# Patient Record
Sex: FEMALE | Race: WHITE | NOT HISPANIC OR LATINO | Employment: UNEMPLOYED | URBAN - METROPOLITAN AREA
[De-identification: names, ages, dates, MRNs, and addresses within clinical notes are randomized per-mention and may not be internally consistent; named-entity substitution may affect disease eponyms.]

---

## 2018-07-20 ENCOUNTER — HOSPITAL ENCOUNTER (EMERGENCY)
Facility: MEDICAL CENTER | Age: 53
End: 2018-07-20
Attending: EMERGENCY MEDICINE
Payer: MEDICAID

## 2018-07-20 VITALS
SYSTOLIC BLOOD PRESSURE: 110 MMHG | DIASTOLIC BLOOD PRESSURE: 63 MMHG | OXYGEN SATURATION: 95 % | RESPIRATION RATE: 18 BRPM | WEIGHT: 143.3 LBS | TEMPERATURE: 97.2 F | HEIGHT: 64 IN | BODY MASS INDEX: 24.46 KG/M2 | HEART RATE: 69 BPM

## 2018-07-20 DIAGNOSIS — Z77.098 ACCIDENTAL EXPOSURE TO HYDROFLUORIC ACID: ICD-10-CM

## 2018-07-20 LAB
AMPHET UR QL SCN: POSITIVE
ANION GAP SERPL CALC-SCNC: 8 MMOL/L (ref 0–11.9)
APAP SERPL-MCNC: <10 UG/ML (ref 10–30)
BARBITURATES UR QL SCN: NEGATIVE
BASOPHILS # BLD AUTO: 0.7 % (ref 0–1.8)
BASOPHILS # BLD: 0.06 K/UL (ref 0–0.12)
BENZODIAZ UR QL SCN: NEGATIVE
BUN SERPL-MCNC: 16 MG/DL (ref 8–22)
BZE UR QL SCN: NEGATIVE
CA-I SERPL-SCNC: 1.2 MMOL/L (ref 1.1–1.3)
CALCIUM SERPL-MCNC: 9.5 MG/DL (ref 8.5–10.5)
CANNABINOIDS UR QL SCN: NEGATIVE
CHLORIDE SERPL-SCNC: 107 MMOL/L (ref 96–112)
CO2 SERPL-SCNC: 28 MMOL/L (ref 20–33)
CREAT SERPL-MCNC: 0.85 MG/DL (ref 0.5–1.4)
EOSINOPHIL # BLD AUTO: 0.2 K/UL (ref 0–0.51)
EOSINOPHIL NFR BLD: 2.4 % (ref 0–6.9)
ERYTHROCYTE [DISTWIDTH] IN BLOOD BY AUTOMATED COUNT: 39.9 FL (ref 35.9–50)
ETHANOL BLD-MCNC: 0 G/DL
GLUCOSE SERPL-MCNC: 104 MG/DL (ref 65–99)
HCT VFR BLD AUTO: 42 % (ref 37–47)
HGB BLD-MCNC: 14.5 G/DL (ref 12–16)
IMM GRANULOCYTES # BLD AUTO: 0.04 K/UL (ref 0–0.11)
IMM GRANULOCYTES NFR BLD AUTO: 0.5 % (ref 0–0.9)
LYMPHOCYTES # BLD AUTO: 1.49 K/UL (ref 1–4.8)
LYMPHOCYTES NFR BLD: 18.1 % (ref 22–41)
MAGNESIUM SERPL-MCNC: 2 MG/DL (ref 1.5–2.5)
MCH RBC QN AUTO: 30.6 PG (ref 27–33)
MCHC RBC AUTO-ENTMCNC: 34.5 G/DL (ref 33.6–35)
MCV RBC AUTO: 88.6 FL (ref 81.4–97.8)
METHADONE UR QL SCN: NEGATIVE
MONOCYTES # BLD AUTO: 0.59 K/UL (ref 0–0.85)
MONOCYTES NFR BLD AUTO: 7.2 % (ref 0–13.4)
NEUTROPHILS # BLD AUTO: 5.87 K/UL (ref 2–7.15)
NEUTROPHILS NFR BLD: 71.1 % (ref 44–72)
NRBC # BLD AUTO: 0 K/UL
NRBC BLD-RTO: 0 /100 WBC
OPIATES UR QL SCN: NEGATIVE
OXYCODONE UR QL SCN: NEGATIVE
PCP UR QL SCN: NEGATIVE
PLATELET # BLD AUTO: 253 K/UL (ref 164–446)
PMV BLD AUTO: 9.2 FL (ref 9–12.9)
POTASSIUM SERPL-SCNC: 4 MMOL/L (ref 3.6–5.5)
PROPOXYPH UR QL SCN: NEGATIVE
RBC # BLD AUTO: 4.74 M/UL (ref 4.2–5.4)
SALICYLATES SERPL-MCNC: 0 MG/DL (ref 15–25)
SODIUM SERPL-SCNC: 143 MMOL/L (ref 135–145)
WBC # BLD AUTO: 8.3 K/UL (ref 4.8–10.8)

## 2018-07-20 PROCEDURE — 83735 ASSAY OF MAGNESIUM: CPT

## 2018-07-20 PROCEDURE — 82330 ASSAY OF CALCIUM: CPT

## 2018-07-20 PROCEDURE — 80307 DRUG TEST PRSMV CHEM ANLYZR: CPT

## 2018-07-20 PROCEDURE — 80048 BASIC METABOLIC PNL TOTAL CA: CPT

## 2018-07-20 PROCEDURE — 93005 ELECTROCARDIOGRAM TRACING: CPT | Performed by: EMERGENCY MEDICINE

## 2018-07-20 PROCEDURE — 99283 EMERGENCY DEPT VISIT LOW MDM: CPT

## 2018-07-20 PROCEDURE — 85025 COMPLETE CBC W/AUTO DIFF WBC: CPT

## 2018-07-20 ASSESSMENT — PAIN SCALES - GENERAL: PAINLEVEL_OUTOF10: 3

## 2018-07-20 NOTE — ED TRIAGE NOTES
Pt ambulates to triage with male guest  Chief Complaint   Patient presents with   • Ingestion of Foreign Substance     a mouthful of rust remover   and vomited, called poison control PTA, told to come to ER for monitoring  Denies SI  Pt asked to wait in lobby, pt updated on triage process and pt asked to inform RN of any changes.

## 2018-07-21 NOTE — ED NOTES
"Pt in ed today for ingestion of rust remover, \"winks rust remover\". Pt states it was NOT an SI attempt but rather an accident. Pt claims rust remover was placed in 5 hr energy bottle and mislabeled. Pt went to drink 5 hr energy and accidentally consumed the rust remover. Complains of nausea, slight abd pain. Pt also admits to using meth yesterday morning.   "

## 2018-07-21 NOTE — ED NOTES
Followed up with Fernando at Poison Control (Case # [id number]). Pt at risk for hyperkalemia, hypomagnesia, and hypocalcemia. Potassium and Calcium results WNL, notified ERP and will run add on Mag. Pt to be monitored for 6-8 hrs.

## 2018-07-21 NOTE — DISCHARGE INSTRUCTIONS
You were seen in the Emergency Department for toxic exposure.    EKG, labs were completed without significant acute abnormalities.    Please use 1,000mg of tylenol or 600mg of ibuprofen every 6 hours as needed for pain.    Please follow up with your primary care physician.    Return to the Emergency Department with chest pain, trouble breathing, persistent vomiting, or other concerns.      Fluorine, Hydrogen Fluoride, and Fluorides FAQs  Fluorides are naturally occurring compounds. Low levels of fluorides in drinking water can help prevent dental cavities. At high levels, fluorides can result in tooth and bone damage. Hydrogen fluoride and fluorine are naturally-occurring gases that are very irritating to the skin, eyes, and respiratory tract. The effects of exposure to any hazardous substance depend on the dose, the duration, how you are exposed, personal traits and habits, and whether other chemicals are present.  WHAT ARE FLUORIDE, HYDROGEN FLUORIDE, AND FLUORINE?  · Fluorides, hydrogen fluoride, and fluorine are chemically related. Fluorine is a naturally-occurring, pale yellow-green gas with a sharp odor. It combines with metals to make fluorides such as sodium fluoride and calcium fluoride, both white solids. Sodium fluoride dissolves easily in water, but calcium fluoride does not. Fluorine also combines with hydrogen to make hydrogen fluoride, a colorless gas. Hydrogen fluoride dissolves in water to form hydrofluoric acid.   · Fluorine and hydrogen fluoride are used to make certain chemical compounds. Hydrofluoric acid is used for etching glass. Other fluoride compounds are used in making steel, chemicals, ceramics, lubricants, dyes, plastics, and pesticides. Fluorides are often added to drinking water supplies and to a variety of dental products, including toothpaste and mouth rinses, to prevent dental cavities.   WHAT HAPPENS TO FLUORIDE, HYDROGEN FLUORIDE, AND FLUORINE WHEN THEY ENTER THE  ENVIRONMENT?  · Fluorine cannot be destroyed in the environment; it can only change its form. Fluorine forms salts with minerals in soil.   · Hydrogen fluoride gas will be absorbed by rain and into clouds and fog to form hydrofluoric acid, which will fall to the ground.   · Fluorides released to the air from volcanoes and industry are carried by wind and rain to nearby water, soil, and food sources.   · Fluorides in water and soil will form strong associations with sediment or soil particles.   · Fluorides will accumulate in plants and animals. In animals, the fluoride accumulates primarily in the bones or shell rather than in soft tissues.   HOW MIGHT I BE EXPOSED TO FLUORIDE, HYDROGEN FLUORIDE, AND FLUORINE?  The general population can be exposed to fluorides in contaminated air, food, drinking water and soil.   People living in communities with fluoridated water or high levels of naturally-occurring fluoride may be exposed to higher levels. People who work or live near industries where fluoride containing substances are used may be exposed to higher levels.   HOW CAN FLUORIDE, HYDROGEN FLUORIDE, AND FLUORINE AFFECT MY HEALTH?  Small amounts of fluoride help prevent tooth cavities, but high levels can harm your health. In adults, exposure to high levels of fluoride can result in denser bones. However, if exposure is high enough, these bones may be more fragile and brittle and there may be a greater risk of breaking the bone. In animals, exposure to extremely high doses of fluoride can result in decreased fertility and sperm and testes damage.  Fluorine and hydrogen fluoride are very irritating to the skin, eyes, and respiratory tract. At high levels, such as may occur through exposure from an industrial accident, hydrogen fluoride may also damage the heart.  HOW LIKELY ARE FLUORIDE, HYDROGEN FLUORIDE, AND FLUORINE TO CAUSE CANCER?  Most of the studies of people living in areas with fluoridated water or naturally  high levels of fluoride in drinking water did not find an association between fluoride and cancer risk. Two animal cancer studies were inconclusive. The international Agency for Research on Cancer (IARC) has determined that the carcinogenicity of fluoride to humans is not classifiable.  HOW DOES FLUORIDE, HYDROGEN FLUORIDE, AND FLUORINE AFFECT CHILDREN?  When used appropriately, fluoride is both safe and effective in preventing and controlling cavities. Drinking or eating excessive fluoride during the time teeth are being formed (before 8 years of age) can cause visible changes in teeth. This condition is called dental fluorosis. At very high concentrations of fluoride, the teeth can become more fragile and sometimes can break. No studies have addressed whether low levels of fluoride will cause birth defects in humans. Birth defects have not been found in most studies of animals.  HOW CAN FAMILIES REDUCE THEIR RISK FOR EXPOSURE TO FLUORIDE, HYDROGEN FLUORIDE, AND FLUORINE?  In the home, children may be exposed to high levels of fluorides if they swallow dental products containing fluoridated toothpaste, gels, or rinses. Parents should supervise brushing and place at most, a small pea size dab of toothpaste on the brush and teach children not to swallow dental products. People who live in areas with high levels of naturally-occurring fluoride in the water should use alternative sources of drinking water, such as bottled water.  IS THERE A MEDICAL TEST TO SHOW WHETHER I HAVE BEEN EXPOSED TO FLUORIDE, HYDROGEN FLUORIDE, AND FLUORINE?  Tests are available to measure fluoride levels in urine; these tests can determine if you have been exposed to higher-than-normal levels of fluorides. The urine test must be performed soon after exposure because fluoride that is not stored in bones leaves the body within a few days. The test cannot be performed in the doctor's office, but can be done at most laboratories that test for  chemical exposure. The urine fluoride test cannot be used to predict the nature or severity of toxic effects. Bone sampling can be done in special cases to measure long-term exposure to fluorides.  HAS THE FEDERAL GOVERNMENT MADE RECOMMENDATIONS TO PROTECT HUMAN HEALTH?  The EPA has set a maximum amount of fluoride allowable in drinking water of 4.0 milligrams per liter of water (4.0 mg/L). For the prevention of dental decay, the Public Health Service (PHS) has, since 1962, recommended that public water supplies contain between 0.7 and 1.2 milligrams of fluoride per liter of drinking water.  The Occupational Safety and Health Administration (OSHA) has set limits of 0.2 milligrams per cubic meter (0.2 mg/m³) for fluorine, 2.0 mg/m³ for hydrogen fluoride, and 2.5 mg/m³ for fluoride in workroom air to protect workers during an 8-hour shift over a 40-hour work week.  WHERE CAN I GET MORE INFORMATION?  [employer]R can tell you where to find occupational and environmental health clinics. Their specialists can recognize, evaluate, and treat illnesses resulting from exposure to hazardous substances. You can also contact your community or state health or environmental quality department if you have any more questions or concerns.  FOR MORE INFORMATION  Agency for Toxic Substances and Disease Registry: www.atsdr.cdc.gov  Document Released: 03/16/2010 Document Revised: 03/11/2013 Document Reviewed: 03/16/2010  [employer]® Patient Information ©2013 [employer].

## 2018-07-21 NOTE — ED NOTES
Rounded on patient, reports no changes/concerns at this time, states she feels well. Sitting up watching tv and speaking with significant other

## 2018-07-21 NOTE — ED PROVIDER NOTES
ED Provider Note    Scribed for Marilou Howard M.D. by Sebastian London. 7/20/2018  5:56 PM    Means of arrival: Walk in  History obtained from: Patient  History limited by: None      CHIEF COMPLAINT  Chief Complaint   Patient presents with   • Ingestion of Foreign Substance     a mouthful of rust remover       HPI  Suzanne Eller is a 52 y.o. Female with a history of aortic stenosis who presents to the Emergency Department for for evaluation after accidentally ingesting Winks rust remover containing hydrofluoric acid earlier today. Patient states that she gave some of this to her neighbor in a 5 hour energy container some time ago.  She drank it today thinking it was an energy drink. She induced emesis following this. Patient confirms associated nausea and burning-feeling of the stomach. She denies any chest pain, shortness of breath, or fever. She states that she is feeling better since the incident. Patient states that bowel movements and urination is normal. Patient states that she does not have suicidal ideations and is not any more depressed than normal. She denies any medication. Patient has a history of meth and alcohol use, but has not used within the past year. Patient smokes cigarettes.    REVIEW OF SYSTEMS  Pertinent positive include nausea and burning-feeling of the stomach. Pertinent negative include chest pain, shortness of breath, or fevers. E      PAST MEDICAL HISTORY   has a past medical history of Aortic stenosis.    SOCIAL HISTORY  Social History     Social History Main Topics   • Smoking status: Current Every Day Smoker     Packs/day: 0.50     Types: Cigarettes   • Alcohol use Yes      Comment: rare    • Drug use: Yes     Types: Inhaled      Comment: meth       SURGICAL HISTORY   has a past surgical history that includes other orthopedic surgery.    CURRENT MEDICATIONS  No current facility-administered medications for this encounter.   None noted      ALLERGIES  Allergies   Allergen  "Reactions   • Vancomycin Anaphylaxis   • Pcn [Penicillins] Hives       PHYSICAL EXAM   VITAL SIGNS: /64   Pulse 85   Temp 36.2 °C (97.2 °F) (Temporal)   Resp 18   Ht 1.626 m (5' 4\")   Wt 65 kg (143 lb 4.8 oz)   SpO2 96%   BMI 24.60 kg/m²    Constitutional: Well appearing middle aged female, Alert in no apparent distress.  HENT: Normocephalic, Atraumatic. Bilateral external ears normal. Nose normal.  Moist mucous membranes.  Oropharynx clear.  Eyes: Pupils are equal and reactive. Conjunctiva normal.   Neck: Supple, full range of motion  Heart: Regular rate and rhythm.  No murmurs.    Lungs: No respiratory distress, normal work of breathing. Lungs clear to auscultation bilaterally.  Abdomen Soft, no distention.  No tenderness to palpation.  Musculoskeletal: Atraumatic. No obvious deformities noted.  No lower extremity edema.  Skin: Warm, Dry.  No erythema, No rash.   Neurologic: Alert and oriented x3. Moving all extremities spontaneously without focal deficits.  Psychiatric: Affect normal, Mood normal, Appears appropriate and not intoxicated.    DIAGNOSTIC STUDIES    EKG  EKG personally reviewed by myself in the absence of a cardiologist showed:  NSR with rate of 76  Normal axis and intervals  No ectopy or hypertrophy  No ST or T wave change  Impression: normal EKG    LABS  Personally reviewed by me  Labs Reviewed   CBC WITH DIFFERENTIAL - Abnormal; Notable for the following:        Result Value    Lymphocytes 18.10 (*)     All other components within normal limits   BASIC METABOLIC PANEL - Abnormal; Notable for the following:     Glucose 104 (*)     All other components within normal limits   URINE DRUG SCREEN - Abnormal; Notable for the following:     Amphetamines Urine Positive (*)     All other components within normal limits   SALICYLATE - Abnormal; Notable for the following:     Salicylates, Quant. 0 (*)     All other components within normal limits   ACETAMINOPHEN   IONIZED CALCIUM   DIAGNOSTIC " ALCOHOL   ESTIMATED GFR   MAGNESIUM       ED COURSE  Vitals:    07/20/18 2100 07/20/18 2130 07/20/18 2200 07/20/18 2245   BP:  111/71 111/70 110/63   Pulse: 77 79 91 69   Resp:    18   Temp:       TempSrc:       SpO2:       Weight:       Height:             Medications administered:  Medications - No data to display      5:56 PM Patient seen and examined at bedside. The patient presents after ingestion of foreign substance. Ordered for estimated GFR, magnesium, ionized calcium, salicylate, acetaminophen, CBC with differential, BMP, Urine drug, diagnositc alcohol, and EKG to evaluate.        MEDICAL DECISION MAKING  Patient with history of drug use who presents with accidental ingestion to hydrofluoric acid just prior to arrival.  She has normal vitals on arrival and is well appearing.  EKG without e/o ischemia or arrhythmia.  No evidence of electrolyte abnormality or acidosis.  Calcium is normal.  No concern for caustic injury.  Patient is not intoxicated and no concerns for intentional ingestion or suicidal ideation.      8:12 PM Discussed the case with Poison Control who recommends electrolyte testing and monitoring for 6 hours.  Case number [id number]    10:38 PM Patient was informed of her lab results and was informed that she will be discharged home. Should she have any worsening symptoms, she is advised to return to the ED for further evaluation. Patient understands and agrees to discharge home.      The patient will return for new or worsening symptoms and is stable at the time of discharge.    DISPOSITION:  Patient will be discharged home in stable condition.    FOLLOW UP:  [address] [address]  [phone]  Call  to establish PCP    [facility], Emergency Dept  [address] [address] [phone]    If symptoms worsen      OUTPATIENT MEDICATIONS:  There are no discharge medications for this patient.        IMPRESSION  (Z77.098)  Accidental exposure to hydrofluoric acid    Results, diagnoses, and treatment options were discussed with the patient and/or family. Patient verbalized understanding of plan of care.    There are no discharge medications for this patient.           Sebastian GIRON (Maryibinés), am scribing for, and in the presence of, Marilou Howard M.D..    Electronically signed by: Sebastian London (Maryibe), 7/20/2018    IMarilou M.D. personally performed the services described in this documentation, as scribed by Sebastian London in my presence, and it is both accurate and complete.    The note accurately reflects work and decisions made by me.  Marilou Howard  7/21/2018  11:04 AM

## 2018-07-21 NOTE — ED NOTES
Pt given discharge instructions, reviewed, had no questions for RN. Verbalizes understanding of follow up with PCP, as well as s/s to return to ED for. VS stable. Pt ambulatory to ED exit with significant other, steady on feet.

## 2018-09-14 ENCOUNTER — HOSPITAL ENCOUNTER (EMERGENCY)
Dept: HOSPITAL 8 - ED | Age: 53
Discharge: HOME | End: 2018-09-14
Payer: MEDICAID

## 2018-09-14 VITALS — BODY MASS INDEX: 24.43 KG/M2 | WEIGHT: 143.08 LBS | HEIGHT: 64 IN

## 2018-09-14 VITALS — SYSTOLIC BLOOD PRESSURE: 125 MMHG | DIASTOLIC BLOOD PRESSURE: 82 MMHG

## 2018-09-14 DIAGNOSIS — K11.5: Primary | ICD-10-CM

## 2018-09-14 DIAGNOSIS — Z88.1: ICD-10-CM

## 2018-09-14 DIAGNOSIS — Z88.0: ICD-10-CM

## 2018-09-14 LAB
ALBUMIN SERPL-MCNC: 3.9 G/DL (ref 3.4–5)
ANION GAP SERPL CALC-SCNC: 8 MMOL/L (ref 5–15)
BASOPHILS # BLD AUTO: 0.07 X10^3/UL (ref 0–0.1)
BASOPHILS NFR BLD AUTO: 1 % (ref 0–1)
CALCIUM SERPL-MCNC: 9 MG/DL (ref 8.5–10.1)
CHLORIDE SERPL-SCNC: 106 MMOL/L (ref 98–107)
CREAT SERPL-MCNC: 0.73 MG/DL (ref 0.55–1.02)
EOSINOPHIL # BLD AUTO: 0.08 X10^3/UL (ref 0–0.4)
EOSINOPHIL NFR BLD AUTO: 1 % (ref 1–7)
ERYTHROCYTE [DISTWIDTH] IN BLOOD BY AUTOMATED COUNT: 12.6 % (ref 9.6–15.2)
LYMPHOCYTES # BLD AUTO: 1.35 X10^3/UL (ref 1–3.4)
LYMPHOCYTES NFR BLD AUTO: 10 % (ref 22–44)
MCH RBC QN AUTO: 31.1 PG (ref 27–34.8)
MCHC RBC AUTO-ENTMCNC: 34.7 G/DL (ref 32.4–35.8)
MCV RBC AUTO: 89.6 FL (ref 80–100)
MD: NO
MONOCYTES # BLD AUTO: 1.04 X10^3/UL (ref 0.2–0.8)
MONOCYTES NFR BLD AUTO: 7 % (ref 2–9)
NEUTROPHILS # BLD AUTO: 11.63 X10^3/UL (ref 1.8–6.8)
NEUTROPHILS NFR BLD AUTO: 82 % (ref 42–75)
PLATELET # BLD AUTO: 298 X10^3/UL (ref 130–400)
PMV BLD AUTO: 7.7 FL (ref 7.4–10.4)
RBC # BLD AUTO: 4.7 X10^6/UL (ref 3.82–5.3)

## 2018-09-14 PROCEDURE — 80048 BASIC METABOLIC PNL TOTAL CA: CPT

## 2018-09-14 PROCEDURE — 36415 COLL VENOUS BLD VENIPUNCTURE: CPT

## 2018-09-14 PROCEDURE — 70491 CT SOFT TISSUE NECK W/DYE: CPT

## 2018-09-14 PROCEDURE — 96374 THER/PROPH/DIAG INJ IV PUSH: CPT

## 2018-09-14 PROCEDURE — 85025 COMPLETE CBC W/AUTO DIFF WBC: CPT

## 2018-09-14 PROCEDURE — 82040 ASSAY OF SERUM ALBUMIN: CPT

## 2018-09-14 PROCEDURE — 99285 EMERGENCY DEPT VISIT HI MDM: CPT

## 2018-10-04 LAB — EKG IMPRESSION: NORMAL

## 2018-10-31 ENCOUNTER — APPOINTMENT (OUTPATIENT)
Dept: ADMISSIONS | Facility: MEDICAL CENTER | Age: 53
End: 2018-10-31
Attending: SPECIALIST
Payer: MEDICAID

## 2018-10-31 RX ORDER — IBUPROFEN 200 MG
400 TABLET ORAL PRN
COMMUNITY
End: 2022-05-26

## 2018-11-01 ENCOUNTER — HOSPITAL ENCOUNTER (OUTPATIENT)
Facility: MEDICAL CENTER | Age: 53
End: 2018-11-01
Attending: SPECIALIST | Admitting: SPECIALIST
Payer: MEDICAID

## 2018-11-01 VITALS
OXYGEN SATURATION: 94 % | TEMPERATURE: 97.3 F | HEIGHT: 64 IN | SYSTOLIC BLOOD PRESSURE: 106 MMHG | WEIGHT: 146.83 LBS | BODY MASS INDEX: 25.07 KG/M2 | DIASTOLIC BLOOD PRESSURE: 59 MMHG

## 2018-11-01 PROCEDURE — 700101 HCHG RX REV CODE 250

## 2018-11-01 PROCEDURE — A9270 NON-COVERED ITEM OR SERVICE: HCPCS

## 2018-11-01 PROCEDURE — 700102 HCHG RX REV CODE 250 W/ 637 OVERRIDE(OP)

## 2018-11-01 RX ORDER — SODIUM CHLORIDE, SODIUM LACTATE, POTASSIUM CHLORIDE, CALCIUM CHLORIDE 600; 310; 30; 20 MG/100ML; MG/100ML; MG/100ML; MG/100ML
INJECTION, SOLUTION INTRAVENOUS ONCE
Status: COMPLETED | OUTPATIENT
Start: 2018-11-01 | End: 2018-11-01

## 2018-11-01 RX ORDER — ERYTHROMYCIN 500 MG/1
500 TABLET, COATED ORAL 4 TIMES DAILY
Qty: 40 TAB | Refills: 0 | Status: SHIPPED | OUTPATIENT
Start: 2018-11-01 | End: 2018-11-11

## 2018-11-01 RX ADMIN — SODIUM CHLORIDE, SODIUM LACTATE, POTASSIUM CHLORIDE, CALCIUM CHLORIDE 1000 ML: 600; 310; 30; 20 INJECTION, SOLUTION INTRAVENOUS at 07:10

## 2018-11-01 ASSESSMENT — PAIN SCALES - GENERAL: PAINLEVEL_OUTOF10: 0

## 2018-11-27 ENCOUNTER — OFFICE VISIT (OUTPATIENT)
Dept: MEDICAL GROUP | Facility: MEDICAL CENTER | Age: 53
End: 2018-11-27
Attending: INTERNAL MEDICINE
Payer: MEDICAID

## 2018-11-27 VITALS
DIASTOLIC BLOOD PRESSURE: 70 MMHG | RESPIRATION RATE: 16 BRPM | SYSTOLIC BLOOD PRESSURE: 112 MMHG | TEMPERATURE: 97.9 F | BODY MASS INDEX: 24.59 KG/M2 | HEART RATE: 98 BPM | WEIGHT: 144 LBS | HEIGHT: 64 IN | OXYGEN SATURATION: 95 %

## 2018-11-27 DIAGNOSIS — Z13.220 SCREENING, LIPID: ICD-10-CM

## 2018-11-27 DIAGNOSIS — K11.5: ICD-10-CM

## 2018-11-27 DIAGNOSIS — Z72.0 TOBACCO USE: ICD-10-CM

## 2018-11-27 DIAGNOSIS — Z12.31 SCREENING MAMMOGRAM, ENCOUNTER FOR: ICD-10-CM

## 2018-11-27 DIAGNOSIS — I35.0 AORTIC VALVE STENOSIS, ETIOLOGY OF CARDIAC VALVE DISEASE UNSPECIFIED: ICD-10-CM

## 2018-11-27 DIAGNOSIS — Z13.1 SCREENING FOR DIABETES MELLITUS: ICD-10-CM

## 2018-11-27 DIAGNOSIS — Z80.3 FAMILY HISTORY OF BREAST CANCER IN MOTHER: ICD-10-CM

## 2018-11-27 DIAGNOSIS — F15.20 METHAMPHETAMINE DEPENDENCE (HCC): ICD-10-CM

## 2018-11-27 DIAGNOSIS — K11.20 SALIVARY GLAND INFECTION: ICD-10-CM

## 2018-11-27 PROBLEM — F10.21 ALCOHOLISM IN REMISSION (HCC): Status: ACTIVE | Noted: 2018-11-27

## 2018-11-27 PROCEDURE — 99204 OFFICE O/P NEW MOD 45 MIN: CPT | Performed by: INTERNAL MEDICINE

## 2018-11-27 PROCEDURE — 99203 OFFICE O/P NEW LOW 30 MIN: CPT | Performed by: INTERNAL MEDICINE

## 2018-11-27 RX ORDER — AMOXICILLIN AND CLAVULANATE POTASSIUM 875; 125 MG/1; MG/1
1 TABLET, FILM COATED ORAL 2 TIMES DAILY
Qty: 14 TAB | Refills: 0 | Status: SHIPPED | OUTPATIENT
Start: 2018-11-27 | End: 2018-12-04

## 2018-11-27 ASSESSMENT — PAIN SCALES - GENERAL: PAINLEVEL: 5=MODERATE PAIN

## 2018-11-27 NOTE — ASSESSMENT & PLAN NOTE
She reports daily methamphetamine use by smoking.  States that she first started at age 13 but has not always used daily.  She is not interested in quitting at this time.  States that she uses to help with her depression and PTSD.  States that she has been on medication for this in the past which had bad side effects and she is not interested in treatment at this time.

## 2018-11-27 NOTE — PROGRESS NOTES
Suzanne Eller is a 53 y.o. 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  Originally, her doctors thought it was a problem with her pulmonary valve but she had a heart catheterization done when she was 16 and they realized it was an aortic valve problem.  States that in her teens she was getting echocardiograms done every 6 months but stopped doing this as an adult.  Her last echocardiogram she believes was about 3-4 years ago.  She reports dyspnea with exertion.  She denies dizziness or lightheadedness.  She denies chest pain, palpitations.      Methamphetamine dependence (HCC)  She reports daily methamphetamine use by smoking.  States that she first started at age 13 but has not always used daily.  She is not interested in quitting at this time.  States that she uses to help with her depression and PTSD.  States that she has been on medication for this in the past which had bad side effects and she is not interested in treatment at this time.      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  She follows with Dr. Solis of ENT.  He plans to remove it as soon as her aortic stenosis has been evaluated and anesthesia considers her to be safe for the procedure.  She gets recurrence infections.  States that she was last treated with a 14-day course of erythromycin on 11/1/18.  States her symptoms improved until today.  The area is tender and draining pus in 2 locations under her tongue.  She is able to express quite a bit of pus from each of those today in clinic.  She has been dealing with this problem off and on for years    Tobacco use  She currently smokes about 5 cigarettes/day.  She is interested in quitting at this  time.  She has been a smoker since age 13 and she has never tried to quit in the past.  She would like to try nicotine replacement as she does not like taking pills.    Family history of breast cancer in mother  Her mother had breast cancer when she was in her 50s.  The patient does not currently get regular mammograms.  Her last was done multiple years ago.  Patient is not entirely sure of the date.  She denies any lumps or bumps in breasts currently.    Current medicines (including changes today)  Current Outpatient Prescriptions   Medication Sig Dispense Refill   • nicotine (NICODERM) 7 MG/24HR PATCH 24 HR Apply 1 Patch to skin as directed every 24 hours. 30 Patch 0   • amoxicillin-clavulanate (AUGMENTIN) 875-125 MG Tab Take 1 Tab by mouth 2 times a day for 7 days. 14 Tab 0   • ibuprofen (ADVIL) 200 MG Tab Take 400 mg by mouth every 6 hours as needed.       No current facility-administered medications for this visit.      She  has a past medical history of Aortic stenosis; Arthritis; Depression; Salivary gland calculi; and Substance abuse ([facility]).  She  has a past surgical history that includes other cardiac surgery (1981); gyn surgery (1981); other orthopedic surgery (Right, 1996); other (1996); and primary c section.  Social History   Substance Use Topics   • Smoking status: Current Every Day Smoker     Packs/day: 0.25     Years: 40.00     Types: Cigarettes   • Smokeless tobacco: Never Used   • Alcohol use No      Comment: history of alcoholism 5L of wine a day for 6 years quit 2000     Social History     Social History Narrative   • No narrative on file     Family History   Problem Relation Age of Onset   • Cancer Mother 50        breast   • Diabetes Mother    • Hypertension Mother    • Alcohol/Drug Father         ETOH, cirrhosis   • Heart Disease Neg Hx    • Stroke Neg Hx    • Hyperlipidemia Neg Hx          ROS  As above in HPI  All other systems reviewed and are negative     Objective:     Blood pressure  "112/70, pulse 98, temperature 36.6 °C (97.9 °F), temperature source Temporal, resp. rate 16, height 1.626 m (5' 4.02\"), weight 65.3 kg (144 lb), SpO2 95 %. Body mass index is 24.71 kg/m².  Physical Exam:    Constitutional: Alert, no distress.  Skin: Warm, dry, good turgor, no rashes in visible areas.  Eye: Equal, round and reactive, conjunctiva clear, lids normal.  ENMT: Lips without lesions, edentulous, right submandibular salivary gland is very enlarged and firm.  With pressure over the glands, pus is expressed in 2 locations beneath the tongue, oropharynx clear, TM's clear bilaterally.  Neck: Trachea midline, no masses, no thyromegaly. No cervical or supraclavicular lymphadenopathy  Respiratory: Unlabored respiratory effort, lungs clear to auscultation, no wheezes, no ronchi.  Cardiovascular: Regular rate and rhythm, 3/6 holosystolic murmur heard best over bilateral upper sternal borders with radiation to the carotids, no lower extremity edema.  Abdomen: Soft, non-tender, no masses, no hepatosplenomegaly.  Psych: Alert and oriented x3, normal affect and mood.        Assessment and Plan:   The following treatment plan was discussed    1. Tobacco use  Given smoking 5 cigarettes a day, will start on lowest dose nicotine patch.  - nicotine (NICODERM) 7 MG/24HR PATCH 24 HR; Apply 1 Patch to skin as directed every 24 hours.  Dispense: 30 Patch; Refill: 0    2. Aortic valve stenosis, etiology of cardiac valve disease unspecified  We will reevaluate degree of stenosis with an echocardiogram preoperatively.  - EC-ECHOCARDIOGRAM COMPLETE W/O CONT; Future    3. Salivary duct calculi  She is scheduled for removal with ENT after her echocardiogram is complete  -Follow-up with ENT for definitive care    4. Screening mammogram, encounter for  Emphasized importance of mammogram today given family history  - MA-SCREEN MAMMO W/CAD-BILAT; Future    5. Screening, lipid  - Lipid Profile; Future    6. Screening for diabetes " mellitus  - HEMOGLOBIN A1C; Future    7. Methamphetamine dependence (HCC)  We discussed the long-term effects of methamphetamine on the heart specifically today.  We discussed the importance of quitting.  She is not ready to do so at this time.  Has a distant history of intravenous use but reports that recent HIV and hepatitis testing have been negative.    8. Salivary gland infection  Recent treatment with erythromycin.  We will treat with Augmentin.  She will follow-up with ENT.  - amoxicillin-clavulanate (AUGMENTIN) 875-125 MG Tab; Take 1 Tab by mouth 2 times a day for 7 days.  Dispense: 14 Tab; Refill: 0    9. Family history of breast cancer in mother  Mammogram ordered today.        Followup: Return in about 4 weeks (around 12/25/2018), or if symptoms worsen or fail to improve, for PAP, labs, discuss colon ca screen.

## 2018-11-27 NOTE — ASSESSMENT & PLAN NOTE
She currently smokes about 5 cigarettes/day.  She is interested in quitting at this time.  She has been a smoker since age 13 and she has never tried to quit in the past.  She would like to try nicotine replacement as she does not like taking pills.

## 2018-11-27 NOTE — ASSESSMENT & PLAN NOTE
She reports being diagnosed at age 2 with a heart murmur.  Originally, her doctors thought it was a problem with her pulmonary valve but she had a heart catheterization done when she was 16 and they realized it was an aortic valve problem.  States that in her teens she was getting echocardiograms done every 6 months but stopped doing this as an adult.  Her last echocardiogram she believes was about 3-4 years ago.  She reports dyspnea with exertion.  She denies dizziness or lightheadedness.  She denies chest pain, palpitations.

## 2018-11-27 NOTE — ASSESSMENT & PLAN NOTE
Her mother had breast cancer when she was in her 50s.  The patient does not currently get regular mammograms.  Her last was done multiple years ago.  Patient is not entirely sure of the date.  She denies any lumps or bumps in breasts currently.

## 2018-11-27 NOTE — ASSESSMENT & PLAN NOTE
She has suffered with recurrent salivary gland infection due to a large obstructing calculus.  She follows with Dr. Solis of ENT.  He plans to remove it as soon as her aortic stenosis has been evaluated and anesthesia considers her to be safe for the procedure.  She gets recurrence infections.  States that she was last treated with a 14-day course of erythromycin on 11/1/18.  States her symptoms improved until today.  The area is tender and draining pus in 2 locations under her tongue.  She is able to express quite a bit of pus from each of those today in clinic.  She has been dealing with this problem off and on for years

## 2018-12-10 ENCOUNTER — HOSPITAL ENCOUNTER (OUTPATIENT)
Dept: CARDIOLOGY | Facility: MEDICAL CENTER | Age: 53
End: 2018-12-10
Attending: INTERNAL MEDICINE
Payer: MEDICAID

## 2018-12-10 DIAGNOSIS — I35.0 AORTIC VALVE STENOSIS, ETIOLOGY OF CARDIAC VALVE DISEASE UNSPECIFIED: ICD-10-CM

## 2018-12-10 LAB
LV EJECT FRACT  99904: 65
LV EJECT FRACT MOD 2C 99903: 59.95
LV EJECT FRACT MOD 4C 99902: 57.95
LV EJECT FRACT MOD BP 99901: 59.47

## 2018-12-10 PROCEDURE — 93306 TTE W/DOPPLER COMPLETE: CPT | Mod: 26 | Performed by: INTERNAL MEDICINE

## 2018-12-10 PROCEDURE — 93306 TTE W/DOPPLER COMPLETE: CPT

## 2018-12-12 ENCOUNTER — HOSPITAL ENCOUNTER (OUTPATIENT)
Dept: RADIOLOGY | Facility: MEDICAL CENTER | Age: 53
End: 2018-12-12
Attending: INTERNAL MEDICINE
Payer: MEDICAID

## 2018-12-12 DIAGNOSIS — Z12.31 SCREENING MAMMOGRAM, ENCOUNTER FOR: ICD-10-CM

## 2018-12-12 PROCEDURE — 77067 SCR MAMMO BI INCL CAD: CPT

## 2018-12-20 ENCOUNTER — OFFICE VISIT (OUTPATIENT)
Dept: MEDICAL GROUP | Facility: MEDICAL CENTER | Age: 53
End: 2018-12-20
Attending: INTERNAL MEDICINE
Payer: MEDICAID

## 2018-12-20 ENCOUNTER — HOSPITAL ENCOUNTER (OUTPATIENT)
Facility: MEDICAL CENTER | Age: 53
End: 2018-12-20
Attending: INTERNAL MEDICINE
Payer: MEDICAID

## 2018-12-20 VITALS
WEIGHT: 144 LBS | HEART RATE: 98 BPM | BODY MASS INDEX: 24.59 KG/M2 | TEMPERATURE: 97.8 F | OXYGEN SATURATION: 97 % | RESPIRATION RATE: 16 BRPM | HEIGHT: 64 IN | SYSTOLIC BLOOD PRESSURE: 122 MMHG | DIASTOLIC BLOOD PRESSURE: 80 MMHG

## 2018-12-20 DIAGNOSIS — I35.0 AORTIC VALVE STENOSIS, ETIOLOGY OF CARDIAC VALVE DISEASE UNSPECIFIED: ICD-10-CM

## 2018-12-20 DIAGNOSIS — L98.9 SKIN LESION: ICD-10-CM

## 2018-12-20 DIAGNOSIS — R92.8 ABNORMAL MAMMOGRAM: ICD-10-CM

## 2018-12-20 DIAGNOSIS — Z12.4 SCREENING FOR MALIGNANT NEOPLASM OF CERVIX: ICD-10-CM

## 2018-12-20 PROBLEM — K11.20 SALIVARY GLAND INFECTION: Status: RESOLVED | Noted: 2018-11-27 | Resolved: 2018-12-20

## 2018-12-20 PROCEDURE — 99214 OFFICE O/P EST MOD 30 MIN: CPT | Performed by: INTERNAL MEDICINE

## 2018-12-20 PROCEDURE — 99213 OFFICE O/P EST LOW 20 MIN: CPT | Performed by: INTERNAL MEDICINE

## 2018-12-20 ASSESSMENT — PAIN SCALES - GENERAL: PAINLEVEL: NO PAIN

## 2018-12-21 NOTE — ASSESSMENT & PLAN NOTE
She reports a raised bump over her right posterior calf.  States is been there several years and may have grown slightly or not at all.  States that last night, she was scratching her leg and accidentally irritated it and it has been very red ever since.  Before that, it was very close to flesh-colored.  It is nontender and does not itch.

## 2018-12-21 NOTE — PROGRESS NOTES
Subjective:   Suzanne Eller is a 53 y.o. female here today for Pap, mole on leg    Screening for malignant neoplasm of cervix  She presents today for Pap.  Her last was over 3 years ago.  She is rarely sexually active.  She recently had a menstrual cycle but has not had one for almost a year.  She denies vaginal discharge, dysuria, hematuria, pelvic pain.    Skin lesion  She reports a raised bump over her right posterior calf.  States is been there several years and may have grown slightly or not at all.  States that last night, she was scratching her leg and accidentally irritated it and it has been very red ever since.  Before that, it was very close to flesh-colored.  It is nontender and does not itch.    Aortic stenosis  Unclear whether this diagnosis is accurate although she has been told in the past that she has had aortic stenosis and her recent salivary gland excision surgery was canceled because of concern for this.  We recently obtained an echocardiogram for further evaluation which showed no aortic valve disease although she does have a murmur on exam.    Abnormal mammogram  She recently had a mammogram which showed a cluster of calcifications.  She is scheduled for diagnostic mammogram with magnification views next week       Current medicines (including changes today)  Current Outpatient Prescriptions   Medication Sig Dispense Refill   • nicotine (NICODERM) 7 MG/24HR PATCH 24 HR Apply 1 Patch to skin as directed every 24 hours. 30 Patch 0   • ibuprofen (ADVIL) 200 MG Tab Take 400 mg by mouth every 6 hours as needed.       No current facility-administered medications for this visit.      She  has a past medical history of Aortic stenosis; Arthritis; Depression; Salivary gland calculi; and Substance abuse (HCC).    ROS   Reports resolution of her salivary gland infection but she has not called ENT to schedule her surgery  Denies chest pain, shortness breath  As above in HPI     Objective:     Blood  "pressure 122/80, pulse 98, temperature 36.6 °C (97.8 °F), temperature source Temporal, resp. rate 16, height 1.626 m (5' 4.02\"), weight 65.3 kg (144 lb), SpO2 97 %. Body mass index is 24.71 kg/m².   Physical Exam:  Constitutional: Alert, no distress.  Skin: Warm, dry, good turgor, approximately 4 mm raised red round regular bump over left posterior calf.  Eye: Equal, round and reactive, conjunctiva clear, lids normal.  Psych: Alert and oriented x3, normal affect and mood.  : external genitalia normal, cervix without discharge or lesions, no cervical motion tenderness, atrophic vaginal canal that is narrow and difficult to pass the smallest speculum through      Results and Imaging:   Echocardiogram (12/10/18):  CONCLUSIONS  No prior study is available for comparison.   Left ventricular ejection fraction is visually estimated to be 65%.  Normal inferior vena cava size and inspiratory collapse.  Structurally normal aortic valve.  Estimated right ventricular systolic pressure is 30 mmHg.  Normal transthoracic echocardiogram.     Assessment and Plan:   The following treatment plan was discussed    1. Screening for malignant neoplasm of cervix  - THINPREP PAP WITH HPV; Future    2. Skin lesion  It is currently inflamed so difficult to tell exactly what it is although I think it is a hypopigmented nevus.  It is very round and regular.  It does not have any features concerning for cancer.  However, I have asked the patient to monitor it.  I would like to take a look at it again at her next visit when the inflammation is down just to make sure it is not concerning.  Would consider biopsy if there are any questions.  -Reevaluate at next visit    3. Aortic stenosis  Was not evident on recent echocardiogram.  I advised her to let her ENT doctor know so that anesthesia can review her echocardiogram prior to salivary gland excision and she can get the surgery done.    4. Abnormal mammogram  She will follow-up with her ordered " diagnostic mammogram next week        Followup: Return in about 3 months (around 3/20/2019), or if symptoms worsen or fail to improve, for Following salivary land excision.

## 2018-12-21 NOTE — ASSESSMENT & PLAN NOTE
She recently had a mammogram which showed a cluster of calcifications.  She is scheduled for diagnostic mammogram with magnification views next week

## 2018-12-21 NOTE — ASSESSMENT & PLAN NOTE
Unclear whether this diagnosis is accurate although she has been told in the past that she has had aortic stenosis and her recent salivary gland excision surgery was canceled because of concern for this.  We recently obtained an echocardiogram for further evaluation which showed no aortic valve disease although she does have a murmur on exam.

## 2018-12-21 NOTE — ASSESSMENT & PLAN NOTE
She presents today for Pap.  Her last was over 3 years ago.  She is rarely sexually active.  She recently had a menstrual cycle but has not had one for almost a year.  She denies vaginal discharge, dysuria, hematuria, pelvic pain.

## 2018-12-22 LAB
FORWARD REASON: SPWHY: NORMAL
FORWARDED TO LAB: SPWHR: NORMAL
SPECIMEN SENT: SPWT1: NORMAL

## 2018-12-28 ENCOUNTER — HOSPITAL ENCOUNTER (OUTPATIENT)
Dept: RADIOLOGY | Facility: MEDICAL CENTER | Age: 53
End: 2018-12-28
Attending: INTERNAL MEDICINE
Payer: MEDICAID

## 2018-12-28 DIAGNOSIS — R92.8 ABNORMAL MAMMOGRAM: ICD-10-CM

## 2018-12-28 PROCEDURE — 77065 DX MAMMO INCL CAD UNI: CPT | Mod: LT

## 2019-04-03 ENCOUNTER — OFFICE VISIT (OUTPATIENT)
Dept: MEDICAL GROUP | Facility: MEDICAL CENTER | Age: 54
End: 2019-04-03
Attending: INTERNAL MEDICINE
Payer: MEDICAID

## 2019-04-03 VITALS
WEIGHT: 144 LBS | BODY MASS INDEX: 24.59 KG/M2 | OXYGEN SATURATION: 96 % | SYSTOLIC BLOOD PRESSURE: 110 MMHG | RESPIRATION RATE: 16 BRPM | TEMPERATURE: 97.9 F | HEART RATE: 96 BPM | HEIGHT: 64 IN | DIASTOLIC BLOOD PRESSURE: 70 MMHG

## 2019-04-03 DIAGNOSIS — R21 RASH: ICD-10-CM

## 2019-04-03 DIAGNOSIS — K11.5: ICD-10-CM

## 2019-04-03 DIAGNOSIS — M19.042 PRIMARY OSTEOARTHRITIS OF LEFT HAND: ICD-10-CM

## 2019-04-03 PROBLEM — Z12.4 SCREENING FOR MALIGNANT NEOPLASM OF CERVIX: Status: RESOLVED | Noted: 2018-12-20 | Resolved: 2019-04-03

## 2019-04-03 PROCEDURE — 99213 OFFICE O/P EST LOW 20 MIN: CPT | Performed by: INTERNAL MEDICINE

## 2019-04-03 PROCEDURE — 99214 OFFICE O/P EST MOD 30 MIN: CPT | Performed by: INTERNAL MEDICINE

## 2019-04-03 RX ORDER — TRIAMCINOLONE ACETONIDE 1 MG/G
OINTMENT TOPICAL
Qty: 30 G | Refills: 0 | Status: SHIPPED | OUTPATIENT
Start: 2019-04-03 | End: 2019-09-19

## 2019-04-03 RX ORDER — ERYTHROMYCIN 500 MG/1
500 TABLET, COATED ORAL 4 TIMES DAILY
Qty: 40 TAB | Refills: 0 | Status: SHIPPED | OUTPATIENT
Start: 2019-04-03 | End: 2019-04-13

## 2019-04-03 ASSESSMENT — PAIN SCALES - GENERAL: PAINLEVEL: 3=SLIGHT PAIN

## 2019-04-03 NOTE — ASSESSMENT & PLAN NOTE
Patient reports approximately 1 week ago developing a very itchy rash over the back of her neck and extending over the trapezius region and up to her hairline.  She has been scratching it a lot and now it is also stinging.  She denies any new skin care products or new laundry detergents.  She has not worn any new clothes, had any new foods, or started new prescription medication or supplements.  She does report a lot of increased stress lately.  No one in the house has a similar rash.  She has not seen any bedbugs or other insects and she denies a history of lice.  She has tried topical hydrocortisone cream but states that the ointment was very old and she thinks it was probably .  It helped a little bit with the itching but did not relieve the rash.

## 2019-04-03 NOTE — ASSESSMENT & PLAN NOTE
She has still been unable to get the surgery required to remove her salivary duct on the left.  She continues to get recurrent infections due to a large calculus that is obstructing the duct.  She reports for that past few weeks she has had pus draining from the area which is her indication that she has an infection.  She denies fevers and chills or pain in the region.  She tried to follow-up with her ear nose and throat doctor but was told that since it is a new year, she needed a new referral.  Therefore she has not been seen since 2018.  She is still very interested in proceeding with surgery for either ductal removal or stone extraction, whatever is more appropriate.

## 2019-04-03 NOTE — PROGRESS NOTES
Subjective:   Suzanne Eller is a 53 y.o. female here today for Follow-up salivary gland infection, new rash, hand pain    Salivary duct calculi  She has still been unable to get the surgery required to remove her salivary duct on the left.  She continues to get recurrent infections due to a large calculus that is obstructing the duct.  She reports for that past few weeks she has had pus draining from the area which is her indication that she has an infection.  She denies fevers and chills or pain in the region.  She tried to follow-up with her ear nose and throat doctor but was told that since it is a new year, she needed a new referral.  Therefore she has not been seen since 2018.  She is still very interested in proceeding with surgery for either ductal removal or stone extraction, whatever is more appropriate.     Rash  Patient reports approximately 1 week ago developing a very itchy rash over the back of her neck and extending over the trapezius region and up to her hairline.  She has been scratching it a lot and now it is also stinging.  She denies any new skin care products or new laundry detergents.  She has not worn any new clothes, had any new foods, or started new prescription medication or supplements.  She does report a lot of increased stress lately.  No one in the house has a similar rash.  She has not seen any bedbugs or other insects and she denies a history of lice.  She has tried topical hydrocortisone cream but states that the ointment was very old and she thinks it was probably .  It helped a little bit with the itching but did not relieve the rash.     Primary osteoarthritis of left hand  She reports pain at the base of her left thumb.  She has difficulty opening jars and doing fine motor movements with her hands because of the pain.  She has osteoarthritis in general in her hands but the base of the thumb is the most painful.  It is currently swollen.  She states that she is dealing  okay with the pain but she would like to regain more function in her hands to eventually allow her to return to work.  In the past, she has worked as a  and .         Current medicines (including changes today)  Current Outpatient Prescriptions   Medication Sig Dispense Refill   • triamcinolone acetonide (KENALOG) 0.1 % Ointment Apply thin layer to rash on back of neck twice daily as needed for up to 1 week 30 g 0   • erythromycin base (E-MYCIN) 500 MG tablet Take 1 Tab by mouth 4 times a day for 10 days. 40 Tab 0   • ibuprofen (ADVIL) 200 MG Tab Take 400 mg by mouth every 6 hours as needed.       No current facility-administered medications for this visit.      She  has a past medical history of Aortic stenosis; Arthritis; Depression; Salivary gland calculi; and Substance abuse (HCC).    ROS   Denies chest pain, shortness of breath  As above in HPI     Objective:      Body mass index is 24.71 kg/m².   Physical Exam:  Constitutional: Alert, no distress.  Skin: Warm, dry, good turgor, excoriated red papular lesions over posterior neck with no extension into hair or scalp and some more scattered lesions over trapezius region with a few on arms.  Eye: Equal, round and reactive, conjunctiva clear, lids normal.  ENMT: Pressure applied to the left salivary gland causes pus to appear from the submandibular and sublingual glands on the left  Neck: Very enlarged left submandibular salivary gland  MSK: Tender to palpation over base of left thumb with some swelling    Assessment and Plan:   The following treatment plan was discussed    1. Salivary duct calculi  She currently has an infection secondary to obstruction.  She responded well to erythromycin in the past and it has been approximately 5 months since she is been treated with erythromycin.  We will restart this for her for the next 10 days.  I have also placed a new referral to ENT so that she can get this duct excised surgically.  - REFERRAL TO  ENT  - erythromycin base (E-MYCIN) 500 MG tablet; Take 1 Tab by mouth 4 times a day for 10 days.  Dispense: 40 Tab; Refill: 0    2. Rash  Unclear etiology.  Less likely parasitic given no other family members have a similar rash.  Excoriations make it difficult to see the original lesions.  We will treat with Kenalog ointment as below.  She was instructed to follow-up if no improvement in 1 week.  Also advised her to wash all of her bedding and clothing again in hot water.  - triamcinolone acetonide (KENALOG) 0.1 % Ointment; Apply thin layer to rash on back of neck twice daily as needed for up to 1 week  Dispense: 30 g; Refill: 0    3. Primary osteoarthritis of left hand  With significant osteoarthritis changes of both hands.  Suspect that her tenderness is also related to osteoarthritis.  We discussed possibility of hand therapy versus referral to pain management for consideration of cortisone injection here.  I am not sure if they would be able to perform that but orthopedics is not an option given her current insurance and she is unable to travel to [place] at this time.  She prefers to get the x-ray first and follow-up with a plan after that.  - DX-HAND 3+ LEFT; Future  -Consider referral to pain management versus hand therapy once imaging is completed      Followup: Return if symptoms worsen or fail to improve.

## 2019-05-09 ENCOUNTER — TELEPHONE (OUTPATIENT)
Dept: MEDICAL GROUP | Facility: MEDICAL CENTER | Age: 54
End: 2019-05-09

## 2019-05-09 DIAGNOSIS — K11.5: ICD-10-CM

## 2019-05-09 NOTE — TELEPHONE ENCOUNTER
Pt was referred to DR Whitten's office for ENT, Dr Whitten is retiring soon and not excepting pt's do to this.

## 2019-07-18 ENCOUNTER — TELEPHONE (OUTPATIENT)
Dept: MEDICAL GROUP | Facility: MEDICAL CENTER | Age: 54
End: 2019-07-18

## 2019-07-18 DIAGNOSIS — I35.0 AORTIC VALVE STENOSIS, ETIOLOGY OF CARDIAC VALVE DISEASE UNSPECIFIED: ICD-10-CM

## 2019-07-18 DIAGNOSIS — R92.8 ABNORMAL MAMMOGRAM: ICD-10-CM

## 2019-07-18 DIAGNOSIS — Z01.810 PREOPERATIVE CARDIOVASCULAR EXAMINATION: ICD-10-CM

## 2019-07-18 NOTE — TELEPHONE ENCOUNTER
Received message from Dr Pavithra Lozano office that Dr Lozano would like to have a one to one phone call regarding this Pt . There office number is [phone]

## 2019-07-18 NOTE — TELEPHONE ENCOUNTER
Received call from Dr. Lozano's office.  I called him back.  He is concerned about the patient's history of aortic stenosis as reason for surgery cancellation in the past.  Although we did obtain an echocardiogram on 12/28/2018 which came back normal, he would prefer to have a cardiology evaluation of the patient prior to surgery.  He still feels that the salivary gland excision is indicated and that the patient does need the procedure done however he would like to have a cardiologist to review the echocardiogram and discuss cardiac risk prior to proceeding.  I let him know that I would put in a referral for cardiology.    Please inform patient that we have placed this cardiology referral and she should be contacted by the referrals department.  She will likely only have to see them once for preoperative risk assessment.  After this, she should be able to have her surgery scheduled.  Please let her know that she also is due for a diagnostic mammogram of the left breast to to her abnormal one in December.  I have ordered this as well so she can have it done at her convenience.    Cassandra Oliveira M.D.

## 2019-07-30 ENCOUNTER — HOSPITAL ENCOUNTER (OUTPATIENT)
Dept: RADIOLOGY | Facility: MEDICAL CENTER | Age: 54
End: 2019-07-30
Attending: INTERNAL MEDICINE
Payer: MEDICAID

## 2019-07-30 DIAGNOSIS — R92.8 ABNORMAL MAMMOGRAM: ICD-10-CM

## 2019-07-30 PROCEDURE — G0279 TOMOSYNTHESIS, MAMMO: HCPCS | Mod: LT

## 2019-08-22 ENCOUNTER — OFFICE VISIT (OUTPATIENT)
Dept: CARDIOLOGY | Facility: MEDICAL CENTER | Age: 54
End: 2019-08-22
Payer: MEDICAID

## 2019-08-22 ENCOUNTER — HOSPITAL ENCOUNTER (OUTPATIENT)
Dept: RADIOLOGY | Facility: MEDICAL CENTER | Age: 54
End: 2019-08-22
Attending: INTERNAL MEDICINE
Payer: MEDICAID

## 2019-08-22 ENCOUNTER — TELEPHONE (OUTPATIENT)
Dept: CARDIOLOGY | Facility: MEDICAL CENTER | Age: 54
End: 2019-08-22

## 2019-08-22 VITALS
BODY MASS INDEX: 24.17 KG/M2 | WEIGHT: 141.6 LBS | DIASTOLIC BLOOD PRESSURE: 60 MMHG | HEIGHT: 64 IN | SYSTOLIC BLOOD PRESSURE: 102 MMHG | OXYGEN SATURATION: 94 % | HEART RATE: 94 BPM

## 2019-08-22 DIAGNOSIS — Z01.810 PREOPERATIVE CARDIOVASCULAR EXAMINATION: ICD-10-CM

## 2019-08-22 DIAGNOSIS — R00.2 PALPITATIONS: ICD-10-CM

## 2019-08-22 DIAGNOSIS — M19.042 PRIMARY OSTEOARTHRITIS OF LEFT HAND: ICD-10-CM

## 2019-08-22 LAB — EKG IMPRESSION: NORMAL

## 2019-08-22 PROCEDURE — 93000 ELECTROCARDIOGRAM COMPLETE: CPT | Performed by: INTERNAL MEDICINE

## 2019-08-22 PROCEDURE — 99204 OFFICE O/P NEW MOD 45 MIN: CPT | Performed by: INTERNAL MEDICINE

## 2019-08-22 PROCEDURE — 73130 X-RAY EXAM OF HAND: CPT | Mod: LT

## 2019-08-22 ASSESSMENT — ENCOUNTER SYMPTOMS
HEARTBURN: 1
ABDOMINAL PAIN: 0
DIAPHORESIS: 1
SORE THROAT: 1
INSOMNIA: 1
DEPRESSION: 1
ORTHOPNEA: 0
FEVER: 0
LOSS OF CONSCIOUSNESS: 0
CHILLS: 0
TINGLING: 1
WEIGHT LOSS: 1
PALPITATIONS: 1
PND: 0
HEADACHES: 1
BLURRED VISION: 1
DIZZINESS: 0
MYALGIAS: 0
BRUISES/BLEEDS EASILY: 1
SHORTNESS OF BREATH: 1

## 2019-08-22 NOTE — TELEPHONE ENCOUNTER
Per SW, pt ok to proceed with left submandibular salivary gland surgery for obstructive stone with Dr. Pavithra Lozano. Clearance letter drafted and faxed to [phone]. Receipt confirmed.

## 2019-08-22 NOTE — LETTER
"     [facility]   [address]  [place] [address]  Phone: [phone]  Fax: [phone]              Suzanne Eller  1965    Encounter Date: 2019    Dima Wilcox M.D.          PROGRESS NOTE:  Chief Complaint   Patient presents with   • Procedure     Surgical clearance    •        Subjective:   Suzanne Eller is a 53 y.o. female who presents today referred by Dr. Trey Oliveira, anesthesiologist for preoperative clearance prior to undergoing left submandibular salivary gland surgery for obstructive stone.    The patient reports that she had a heart murmur since age 2 initially diagnosed with \"pulmonic stenosis\" and then underwent a cardiac catheterization in [place] at age 16 and was diagnosed with \"aortic stenosis\".  Since that time she has been on prophylactic antibiotics prior to any procedures.    Patient states that her last cardiologist that she saw was Dr. Ant Augustin with [facility] cardiology who had cleared her for her salivary gland surgery however the procedure was subsequently canceled because cardiac clearance time had .  An echocardiogram in 2018 at [facility] was completely normal.    The patient has additional significant past medical history of polysubstance abuse including chronic tobacco use since age 13, chronic methamphetamine use since age 13 and alcohol abuse in addition to depression, insomnia, GE reflux disease, arthritis and 4 C-sections.    The patient states that she has no significant exertional shortness of breath out of proportion to her level of work or activity at the time, lightheadedness, dizziness or syncope.  She has a rare palpitation.  No syncope.    Past Medical History:   Diagnosis Date   • Aortic stenosis     aortic stenosis   • Arthritis     hands   • Depression    • Salivary gland calculi    • Substance abuse (HCC)      Past Surgical History:   Procedure Laterality " Date   • OTHER ORTHOPEDIC SURGERY Right     rotator cuff repair   • OTHER      chest tube   • OTHER CARDIAC SURGERY      cath   • GYN SURGERY         • PRIMARY C SECTION      x 4     Family History   Problem Relation Age of Onset   • Cancer Mother 50        breast   • Diabetes Mother    • Hypertension Mother    • Alcohol/Drug Father         ETOH, cirrhosis   • Heart Disease Neg Hx    • Stroke Neg Hx    • Hyperlipidemia Neg Hx      Social History     Socioeconomic History   • Marital status: Legally      Spouse name: Not on file   • Number of children: Not on file   • Years of education: Not on file   • Highest education level: Not on file   Occupational History   • Not on file   Social Needs   • Financial resource strain: Not on file   • Food insecurity:     Worry: Not on file     Inability: Not on file   • Transportation needs:     Medical: Not on file     Non-medical: Not on file   Tobacco Use   • Smoking status: Current Every Day Smoker     Packs/day: 0.25     Years: 40.00     Pack years: 10.00     Types: Cigarettes   • Smokeless tobacco: Never Used   Substance and Sexual Activity   • Alcohol use: No     Comment: history of alcoholism 5L of wine a day for 6 years quit    • Drug use: Yes     Types: Inhaled     Comment: daily meth by smoking   • Sexual activity: Not Currently     Birth control/protection: Post-Menopausal   Lifestyle   • Physical activity:     Days per week: Not on file     Minutes per session: Not on file   • Stress: Not on file   Relationships   • Social connections:     Talks on phone: Not on file     Gets together: Not on file     Attends Voodoo service: Not on file     Active member of club or organization: Not on file     Attends meetings of clubs or organizations: Not on file     Relationship status: Not on file   • Intimate partner violence:     Fear of current or ex partner: Not on file     Emotionally abused: Not on file     Physically abused: Not on  "file     Forced sexual activity: Not on file   Other Topics Concern   • Not on file   Social History Narrative   • Not on file     Allergies   Allergen Reactions   • Vancomycin Anaphylaxis   • Pcn [Penicillins] Hives   • Vicodin [Hydrocodone-Acetaminophen] Vomiting and Nausea     Outpatient Encounter Medications as of 8/22/2019   Medication Sig Dispense Refill   • triamcinolone acetonide (KENALOG) 0.1 % Ointment Apply thin layer to rash on back of neck twice daily as needed for up to 1 week 30 g 0   • ibuprofen (ADVIL) 200 MG Tab Take 400 mg by mouth every 6 hours as needed.       No facility-administered encounter medications on file as of 8/22/2019.      Review of Systems   Constitutional: Positive for diaphoresis, malaise/fatigue and weight loss. Negative for chills and fever.   HENT: Positive for sore throat. Negative for congestion.    Eyes: Positive for blurred vision.   Respiratory: Positive for shortness of breath.    Cardiovascular: Positive for palpitations. Negative for chest pain, orthopnea, leg swelling and PND.   Gastrointestinal: Positive for heartburn. Negative for abdominal pain.   Genitourinary: Negative for dysuria.   Musculoskeletal: Negative for joint pain and myalgias.   Skin: Positive for itching. Negative for rash.   Neurological: Positive for tingling and headaches. Negative for dizziness and loss of consciousness.   Endo/Heme/Allergies: Bruises/bleeds easily.   Psychiatric/Behavioral: Positive for depression. The patient has insomnia.         Objective:   /60 (BP Location: Left arm, Patient Position: Sitting, BP Cuff Size: Adult)   Pulse 94   Ht 1.626 m (5' 4.02\")   Wt 64.2 kg (141 lb 9.6 oz)   SpO2 94%   BMI 24.29 kg/m²      Physical Exam   Constitutional: She is oriented to person, place, and time. She appears well-developed and well-nourished. No distress.   HENT:   Head: Normocephalic and atraumatic.   Left submandibular gland enlargement.  Edentulous.     Eyes: Pupils are " "equal, round, and reactive to light. Conjunctivae and EOM are normal. No scleral icterus.   Neck: Normal range of motion. Neck supple. No JVD present. No thyromegaly present.   Cardiovascular: Normal rate, regular rhythm, normal heart sounds and intact distal pulses. Exam reveals no gallop and no friction rub.   No murmur heard.  Pulmonary/Chest: Effort normal and breath sounds normal. No accessory muscle usage. No respiratory distress. She has no wheezes. She has no rales.   Abdominal: Soft. Bowel sounds are normal. She exhibits no abdominal bruit, no pulsatile midline mass and no mass. There is no hepatosplenomegaly. There is no tenderness.   Musculoskeletal: She exhibits no edema.   Right shoulder scar.   Neurological: She is alert and oriented to person, place, and time. Coordination normal.   Skin: Skin is warm, dry and intact. No rash noted. No cyanosis. Nails show no clubbing.   Psychiatric: She has a normal mood and affect. Her behavior is normal.   Vitals reviewed.    EKG 08/22/2019 normal sinus rhythm, rate 81.  Within normal limits.  Personally reviewed and interpreted.    ECHOCARDIOGRAM 12/10/2018  No prior study is available for comparison.   Left ventricular ejection fraction is visually estimated to be 65%.  Normal inferior vena cava size and inspiratory collapse.  Structurally normal aortic valve.  Estimated right ventricular systolic pressure is 30 mmHg.  Normal transthoracic echocardiogram.    Assessment:     1. Preoperative cardiovascular examination     2. Palpitations  EKG       Medical Decision Making:  Today's Assessment / Status / Plan:     Recommendation Discussion  1.  The patient has a normal cardiovascular examination.  2.  The patient has a normal echocardiogram; I personally reviewed the images from the 12/2018 study.  3.  I informed the patient that she has no evidence of \"aortic stenosis\".  4.  Patient was instructed and counseled to completely abstain from methamphetamine which she " says she uses once a week when she needs some energy to get something done and to abstain from smoking cigarettes.      Trey Oliveira M.D.  [address]  [place] [address]  VIA Mail     Pavithra Lozano M.D.  [address]  [place] [address]  VIA Facsimile: [phone]

## 2019-08-22 NOTE — PROGRESS NOTES
"Chief Complaint   Patient presents with   • Procedure     Surgical clearance    •        Subjective:   Suzanne Eller is a 53 y.o. female who presents today referred by Dr. Trey Oliveira, anesthesiologist for preoperative clearance prior to undergoing left submandibular salivary gland surgery for obstructive stone.    The patient reports that she had a heart murmur since age 2 initially diagnosed with \"pulmonic stenosis\" and then underwent a cardiac catheterization in [place] at age 16 and was diagnosed with \"aortic stenosis\".  Since that time she has been on prophylactic antibiotics prior to any procedures.    Patient states that her last cardiologist that she saw was Dr. Ant Augutsin with [facility] cardiology who had cleared her for her salivary gland surgery however the procedure was subsequently canceled because cardiac clearance time had .  An echocardiogram in 2018 at [facility] was completely normal.    The patient has additional significant past medical history of polysubstance abuse including chronic tobacco use since age 13, chronic methamphetamine use since age 13 and alcohol abuse in addition to depression, insomnia, GE reflux disease, arthritis and 4 C-sections.    The patient states that she has no significant exertional shortness of breath out of proportion to her level of work or activity at the time, lightheadedness, dizziness or syncope.  She has a rare palpitation.  No syncope.    Past Medical History:   Diagnosis Date   • Aortic stenosis     aortic stenosis   • Arthritis     hands   • Depression    • Salivary gland calculi    • Substance abuse (HCC)      Past Surgical History:   Procedure Laterality Date   • OTHER ORTHOPEDIC SURGERY Right     rotator cuff repair   • OTHER      chest tube   • OTHER CARDIAC SURGERY      cath   • GYN SURGERY         • PRIMARY C SECTION      x 4     Family History   Problem Relation Age of Onset   • Cancer " Mother 50        breast   • Diabetes Mother    • Hypertension Mother    • Alcohol/Drug Father         ETOH, cirrhosis   • Heart Disease Neg Hx    • Stroke Neg Hx    • Hyperlipidemia Neg Hx      Social History     Socioeconomic History   • Marital status: Legally      Spouse name: Not on file   • Number of children: Not on file   • Years of education: Not on file   • Highest education level: Not on file   Occupational History   • Not on file   Social Needs   • Financial resource strain: Not on file   • Food insecurity:     Worry: Not on file     Inability: Not on file   • Transportation needs:     Medical: Not on file     Non-medical: Not on file   Tobacco Use   • Smoking status: Current Every Day Smoker     Packs/day: 0.25     Years: 40.00     Pack years: 10.00     Types: Cigarettes   • Smokeless tobacco: Never Used   Substance and Sexual Activity   • Alcohol use: No     Comment: history of alcoholism 5L of wine a day for 6 years quit 2000   • Drug use: Yes     Types: Inhaled     Comment: daily meth by smoking   • Sexual activity: Not Currently     Birth control/protection: Post-Menopausal   Lifestyle   • Physical activity:     Days per week: Not on file     Minutes per session: Not on file   • Stress: Not on file   Relationships   • Social connections:     Talks on phone: Not on file     Gets together: Not on file     Attends [employer] service: Not on file     Active member of club or organization: Not on file     Attends meetings of clubs or organizations: Not on file     Relationship status: Not on file   • Intimate partner violence:     Fear of current or ex partner: Not on file     Emotionally abused: Not on file     Physically abused: Not on file     Forced sexual activity: Not on file   Other Topics Concern   • Not on file   Social History Narrative   • Not on file     Allergies   Allergen Reactions   • Vancomycin Anaphylaxis   • Pcn [Penicillins] Hives   • Vicodin [Hydrocodone-Acetaminophen]  "Vomiting and Nausea     Outpatient Encounter Medications as of 8/22/2019   Medication Sig Dispense Refill   • triamcinolone acetonide (KENALOG) 0.1 % Ointment Apply thin layer to rash on back of neck twice daily as needed for up to 1 week 30 g 0   • ibuprofen (ADVIL) 200 MG Tab Take 400 mg by mouth every 6 hours as needed.       No facility-administered encounter medications on file as of 8/22/2019.      Review of Systems   Constitutional: Positive for diaphoresis, malaise/fatigue and weight loss. Negative for chills and fever.   HENT: Positive for sore throat. Negative for congestion.    Eyes: Positive for blurred vision.   Respiratory: Positive for shortness of breath.    Cardiovascular: Positive for palpitations. Negative for chest pain, orthopnea, leg swelling and PND.   Gastrointestinal: Positive for heartburn. Negative for abdominal pain.   Genitourinary: Negative for dysuria.   Musculoskeletal: Negative for joint pain and myalgias.   Skin: Positive for itching. Negative for rash.   Neurological: Positive for tingling and headaches. Negative for dizziness and loss of consciousness.   Endo/Heme/Allergies: Bruises/bleeds easily.   Psychiatric/Behavioral: Positive for depression. The patient has insomnia.         Objective:   /60 (BP Location: Left arm, Patient Position: Sitting, BP Cuff Size: Adult)   Pulse 94   Ht 1.626 m (5' 4.02\")   Wt 64.2 kg (141 lb 9.6 oz)   SpO2 94%   BMI 24.29 kg/m²     Physical Exam   Constitutional: She is oriented to person, place, and time. She appears well-developed and well-nourished. No distress.   HENT:   Head: Normocephalic and atraumatic.   Left submandibular gland enlargement.  Edentulous.     Eyes: Pupils are equal, round, and reactive to light. Conjunctivae and EOM are normal. No scleral icterus.   Neck: Normal range of motion. Neck supple. No JVD present. No thyromegaly present.   Cardiovascular: Normal rate, regular rhythm, normal heart sounds and intact distal " "pulses. Exam reveals no gallop and no friction rub.   No murmur heard.  Pulmonary/Chest: Effort normal and breath sounds normal. No accessory muscle usage. No respiratory distress. She has no wheezes. She has no rales.   Abdominal: Soft. Bowel sounds are normal. She exhibits no abdominal bruit, no pulsatile midline mass and no mass. There is no hepatosplenomegaly. There is no tenderness.   Musculoskeletal: She exhibits no edema.   Right shoulder scar.   Neurological: She is alert and oriented to person, place, and time. Coordination normal.   Skin: Skin is warm, dry and intact. No rash noted. No cyanosis. Nails show no clubbing.   Psychiatric: She has a normal mood and affect. Her behavior is normal.   Vitals reviewed.    EKG 08/22/2019 normal sinus rhythm, rate 81.  Within normal limits.  Personally reviewed and interpreted.    ECHOCARDIOGRAM 12/10/2018  No prior study is available for comparison.   Left ventricular ejection fraction is visually estimated to be 65%.  Normal inferior vena cava size and inspiratory collapse.  Structurally normal aortic valve.  Estimated right ventricular systolic pressure is 30 mmHg.  Normal transthoracic echocardiogram.    Assessment:     1. Preoperative cardiovascular examination     2. Palpitations  EKG       Medical Decision Making:  Today's Assessment / Status / Plan:     Recommendation Discussion  1.  The patient has a normal cardiovascular examination.  2.  The patient has a normal echocardiogram; I personally reviewed the images from the 12/2018 study.  3.  I informed the patient that she has no evidence of \"aortic stenosis\".  4.  Patient was instructed and counseled to completely abstain from methamphetamine which she says she uses once a week when she needs some energy to get something done and to abstain from smoking cigarettes.  "

## 2019-08-22 NOTE — LETTER
PROCEDURE/SURGERY CLEARANCE FORM      Encounter Date: 8/22/2019    Patient: Suzanne Eller  YOB: 1965    CARDIOLOGIST:  Dima Wilcox MD   REFERRING DOCTOR:  Pavithra Lozano MD      The above patient is ok to proceed to have the following procedure/surgery: left submandibular salivary gland surgery for obstructive stone.                                           Additional comments: None                 MD Signature  Dima Wilcox MD

## 2019-09-16 ENCOUNTER — TELEPHONE (OUTPATIENT)
Dept: MEDICAL GROUP | Facility: MEDICAL CENTER | Age: 54
End: 2019-09-16

## 2019-09-16 NOTE — TELEPHONE ENCOUNTER
VOICEMAIL  1. Caller Name: Pt                      Call Back Number: [phone] (home)       2. Message: Pt. States she has the same as before in her mouth and has puss coming out of both of her glands. States she has an appt for surgery on October 4th but needs to get rid of the infection prior to surgery. Advised she may be seen in [facility] however she requested the message to be sent to Dr. Oliveira.  Thank you, please advise.    3. Patient approves office to leave a detailed voicemail/MyChart message: yes

## 2019-09-18 ENCOUNTER — TELEPHONE (OUTPATIENT)
Dept: MEDICAL GROUP | Facility: MEDICAL CENTER | Age: 54
End: 2019-09-18

## 2019-09-18 RX ORDER — ERYTHROMYCIN 500 MG/1
500 TABLET, COATED ORAL 4 TIMES DAILY
Qty: 40 TAB | Refills: 0 | Status: SHIPPED | OUTPATIENT
Start: 2019-09-18 | End: 2019-09-28

## 2019-09-18 NOTE — TELEPHONE ENCOUNTER
Phone Number Called: [phone] (home)       Call outcome: left message for patient to call back regarding message below    Message: LM for patient to call back to discuss (1st attempt)

## 2019-09-18 NOTE — TELEPHONE ENCOUNTER
Please let her know that I have sent over prescription for the erythromycin to her pharmacy which is what she took in April last and worked well for her.  She should take it 4 times a day for the next 10 days which should hopefully clear up the infection before her surgery.    Cassandra Oliveira M.D.

## 2019-09-19 DIAGNOSIS — Z01.812 PRE-OPERATIVE LABORATORY EXAMINATION: ICD-10-CM

## 2019-09-19 LAB
ERYTHROCYTE [DISTWIDTH] IN BLOOD BY AUTOMATED COUNT: 40.5 FL (ref 35.9–50)
HCT VFR BLD AUTO: 45.1 % (ref 37–47)
HGB BLD-MCNC: 15.5 G/DL (ref 12–16)
MCH RBC QN AUTO: 30.8 PG (ref 27–33)
MCHC RBC AUTO-ENTMCNC: 34.4 G/DL (ref 33.6–35)
MCV RBC AUTO: 89.5 FL (ref 81.4–97.8)
PLATELET # BLD AUTO: 263 K/UL (ref 164–446)
PMV BLD AUTO: 9.9 FL (ref 9–12.9)
RBC # BLD AUTO: 5.04 M/UL (ref 4.2–5.4)
WBC # BLD AUTO: 6.4 K/UL (ref 4.8–10.8)

## 2019-09-19 PROCEDURE — 36415 COLL VENOUS BLD VENIPUNCTURE: CPT

## 2019-09-19 PROCEDURE — 85027 COMPLETE CBC AUTOMATED: CPT

## 2019-09-19 RX ORDER — CLINDAMYCIN HYDROCHLORIDE 300 MG/1
300 CAPSULE ORAL 2 TIMES DAILY
COMMUNITY
End: 2022-05-26

## 2019-09-19 RX ORDER — MULTIVIT WITH MINERALS/LUTEIN
TABLET ORAL 2 TIMES DAILY
COMMUNITY
End: 2022-05-26

## 2019-09-19 NOTE — TELEPHONE ENCOUNTER
Please call her pharmacy and have them cancel the prescription for the erythromycin.    Cassandra Oliveira M.D.

## 2019-09-19 NOTE — TELEPHONE ENCOUNTER
VOICEMAIL  1. Caller Name: Suzanne Eller      Call Back Number: [phone] (home)       2. Message: Patient LM stating that she no longer needs the medication/antibiotics because she got it taken care of from another provider.     3. Patient approves office to leave a detailed voicemail/MyChart message: yes

## 2019-10-04 ENCOUNTER — HOSPITAL ENCOUNTER (OUTPATIENT)
Facility: MEDICAL CENTER | Age: 54
End: 2019-10-04
Attending: SPECIALIST | Admitting: SPECIALIST
Payer: MEDICAID

## 2019-10-04 ENCOUNTER — ANESTHESIA EVENT (OUTPATIENT)
Dept: SURGERY | Facility: MEDICAL CENTER | Age: 54
End: 2019-10-04
Payer: MEDICAID

## 2019-10-04 ENCOUNTER — ANESTHESIA (OUTPATIENT)
Dept: SURGERY | Facility: MEDICAL CENTER | Age: 54
End: 2019-10-04
Payer: MEDICAID

## 2019-10-04 VITALS
OXYGEN SATURATION: 100 % | WEIGHT: 141.09 LBS | HEART RATE: 75 BPM | SYSTOLIC BLOOD PRESSURE: 103 MMHG | DIASTOLIC BLOOD PRESSURE: 58 MMHG | BODY MASS INDEX: 24.09 KG/M2 | RESPIRATION RATE: 16 BRPM | HEIGHT: 64 IN | TEMPERATURE: 98.8 F

## 2019-10-04 LAB
GRAM STN SPEC: ABNORMAL
PATHOLOGY CONSULT NOTE: NORMAL
SIGNIFICANT IND 70042: ABNORMAL
SITE SITE: ABNORMAL
SOURCE SOURCE: ABNORMAL

## 2019-10-04 PROCEDURE — 501445 HCHG STAPLER, SKIN DISP: Performed by: SPECIALIST

## 2019-10-04 PROCEDURE — 500440 HCHG DRESSING, STERILE ROLL (KERLIX): Performed by: SPECIALIST

## 2019-10-04 PROCEDURE — 87116 MYCOBACTERIA CULTURE: CPT

## 2019-10-04 PROCEDURE — 88307 TISSUE EXAM BY PATHOLOGIST: CPT

## 2019-10-04 PROCEDURE — 500389 HCHG DRAIN, RESERVOIR SUCT JP 100CC: Performed by: SPECIALIST

## 2019-10-04 PROCEDURE — 160046 HCHG PACU - 1ST 60 MINS PHASE II: Performed by: SPECIALIST

## 2019-10-04 PROCEDURE — 160047 HCHG PACU  - EA ADDL 30 MINS PHASE II: Performed by: SPECIALIST

## 2019-10-04 PROCEDURE — 700111 HCHG RX REV CODE 636 W/ 250 OVERRIDE (IP): Performed by: SPECIALIST

## 2019-10-04 PROCEDURE — 160025 RECOVERY II MINUTES (STATS): Performed by: SPECIALIST

## 2019-10-04 PROCEDURE — 700102 HCHG RX REV CODE 250 W/ 637 OVERRIDE(OP): Performed by: SPECIALIST

## 2019-10-04 PROCEDURE — 500383 HCHG DRAIN, PENROSE 3/8X12: Performed by: SPECIALIST

## 2019-10-04 PROCEDURE — A9270 NON-COVERED ITEM OR SERVICE: HCPCS | Performed by: ANESTHESIOLOGY

## 2019-10-04 PROCEDURE — 87076 CULTURE ANAEROBE IDENT EACH: CPT

## 2019-10-04 PROCEDURE — 87075 CULTR BACTERIA EXCEPT BLOOD: CPT

## 2019-10-04 PROCEDURE — 700105 HCHG RX REV CODE 258: Performed by: SPECIALIST

## 2019-10-04 PROCEDURE — 160002 HCHG RECOVERY MINUTES (STAT): Performed by: SPECIALIST

## 2019-10-04 PROCEDURE — 87205 SMEAR GRAM STAIN: CPT

## 2019-10-04 PROCEDURE — A9270 NON-COVERED ITEM OR SERVICE: HCPCS | Performed by: SPECIALIST

## 2019-10-04 PROCEDURE — 700102 HCHG RX REV CODE 250 W/ 637 OVERRIDE(OP): Performed by: ANESTHESIOLOGY

## 2019-10-04 PROCEDURE — 88304 TISSUE EXAM BY PATHOLOGIST: CPT

## 2019-10-04 PROCEDURE — 500423 HCHG DRESSING, ABD COMBINE: Performed by: SPECIALIST

## 2019-10-04 PROCEDURE — 87077 CULTURE AEROBIC IDENTIFY: CPT

## 2019-10-04 PROCEDURE — 160029 HCHG SURGERY MINUTES - 1ST 30 MINS LEVEL 4: Performed by: SPECIALIST

## 2019-10-04 PROCEDURE — 502594 HCHG SCISSOR HANDLE, HARMONIC ACE: Performed by: SPECIALIST

## 2019-10-04 PROCEDURE — 87070 CULTURE OTHR SPECIMN AEROBIC: CPT

## 2019-10-04 PROCEDURE — 87102 FUNGUS ISOLATION CULTURE: CPT

## 2019-10-04 PROCEDURE — 87206 SMEAR FLUORESCENT/ACID STAI: CPT

## 2019-10-04 PROCEDURE — 160041 HCHG SURGERY MINUTES - EA ADDL 1 MIN LEVEL 4: Performed by: SPECIALIST

## 2019-10-04 PROCEDURE — 501838 HCHG SUTURE GENERAL: Performed by: SPECIALIST

## 2019-10-04 PROCEDURE — 87015 SPECIMEN INFECT AGNT CONCNTJ: CPT

## 2019-10-04 PROCEDURE — 160048 HCHG OR STATISTICAL LEVEL 1-5: Performed by: SPECIALIST

## 2019-10-04 PROCEDURE — 500373 HCHG DRAIN, J-P FLAT 10MM 7044: Performed by: SPECIALIST

## 2019-10-04 PROCEDURE — 700101 HCHG RX REV CODE 250: Performed by: ANESTHESIOLOGY

## 2019-10-04 PROCEDURE — 160009 HCHG ANES TIME/MIN: Performed by: SPECIALIST

## 2019-10-04 PROCEDURE — A6403 STERILE GAUZE>16 <= 48 SQ IN: HCPCS | Performed by: SPECIALIST

## 2019-10-04 PROCEDURE — 700101 HCHG RX REV CODE 250: Performed by: SPECIALIST

## 2019-10-04 PROCEDURE — 160036 HCHG PACU - EA ADDL 30 MINS PHASE I: Performed by: SPECIALIST

## 2019-10-04 PROCEDURE — 160035 HCHG PACU - 1ST 60 MINS PHASE I: Performed by: SPECIALIST

## 2019-10-04 PROCEDURE — 700111 HCHG RX REV CODE 636 W/ 250 OVERRIDE (IP): Performed by: ANESTHESIOLOGY

## 2019-10-04 PROCEDURE — 110454 HCHG SHELL REV 250: Performed by: SPECIALIST

## 2019-10-04 PROCEDURE — 500433 HCHG DRESSING, KLING 4': Performed by: SPECIALIST

## 2019-10-04 RX ORDER — HYDROMORPHONE HYDROCHLORIDE 2 MG/ML
INJECTION, SOLUTION INTRAMUSCULAR; INTRAVENOUS; SUBCUTANEOUS PRN
Status: DISCONTINUED | OUTPATIENT
Start: 2019-10-04 | End: 2019-10-04 | Stop reason: SURG

## 2019-10-04 RX ORDER — OXYCODONE HCL 5 MG/5 ML
5 SOLUTION, ORAL ORAL EVERY 4 HOURS PRN
Status: DISCONTINUED | OUTPATIENT
Start: 2019-10-04 | End: 2019-10-04 | Stop reason: HOSPADM

## 2019-10-04 RX ORDER — LIDOCAINE HYDROCHLORIDE 10 MG/ML
INJECTION, SOLUTION INFILTRATION; PERINEURAL
Status: DISCONTINUED
Start: 2019-10-04 | End: 2019-10-04 | Stop reason: HOSPADM

## 2019-10-04 RX ORDER — LIDOCAINE HYDROCHLORIDE 10 MG/ML
INJECTION, SOLUTION INFILTRATION; PERINEURAL
Status: DISCONTINUED | OUTPATIENT
Start: 2019-10-04 | End: 2019-10-04 | Stop reason: HOSPADM

## 2019-10-04 RX ORDER — ONDANSETRON 2 MG/ML
INJECTION INTRAMUSCULAR; INTRAVENOUS PRN
Status: DISCONTINUED | OUTPATIENT
Start: 2019-10-04 | End: 2019-10-04 | Stop reason: SURG

## 2019-10-04 RX ORDER — KETOROLAC TROMETHAMINE 30 MG/ML
INJECTION, SOLUTION INTRAMUSCULAR; INTRAVENOUS PRN
Status: DISCONTINUED | OUTPATIENT
Start: 2019-10-04 | End: 2019-10-04 | Stop reason: SURG

## 2019-10-04 RX ORDER — EPINEPHRINE 1 MG/ML(1)
AMPUL (ML) INJECTION
Status: DISCONTINUED | OUTPATIENT
Start: 2019-10-04 | End: 2019-10-04 | Stop reason: HOSPADM

## 2019-10-04 RX ORDER — SODIUM CHLORIDE, SODIUM LACTATE, POTASSIUM CHLORIDE, AND CALCIUM CHLORIDE .6; .31; .03; .02 G/100ML; G/100ML; G/100ML; G/100ML
500 INJECTION, SOLUTION INTRAVENOUS
Status: DISCONTINUED | OUTPATIENT
Start: 2019-10-04 | End: 2019-10-04 | Stop reason: HOSPADM

## 2019-10-04 RX ORDER — OXYCODONE HCL 5 MG/5 ML
10 SOLUTION, ORAL ORAL
Status: DISCONTINUED | OUTPATIENT
Start: 2019-10-04 | End: 2019-10-04 | Stop reason: HOSPADM

## 2019-10-04 RX ORDER — BUPIVACAINE HYDROCHLORIDE 5 MG/ML
INJECTION, SOLUTION EPIDURAL; INTRACAUDAL
Status: DISCONTINUED | OUTPATIENT
Start: 2019-10-04 | End: 2019-10-04 | Stop reason: HOSPADM

## 2019-10-04 RX ORDER — DIPHENHYDRAMINE HYDROCHLORIDE 50 MG/ML
12.5 INJECTION INTRAMUSCULAR; INTRAVENOUS
Status: DISCONTINUED | OUTPATIENT
Start: 2019-10-04 | End: 2019-10-04 | Stop reason: HOSPADM

## 2019-10-04 RX ORDER — ONDANSETRON 2 MG/ML
4 INJECTION INTRAMUSCULAR; INTRAVENOUS
Status: COMPLETED | OUTPATIENT
Start: 2019-10-04 | End: 2019-10-04

## 2019-10-04 RX ORDER — MEPERIDINE HYDROCHLORIDE 25 MG/ML
6.25 INJECTION INTRAMUSCULAR; INTRAVENOUS; SUBCUTANEOUS
Status: DISCONTINUED | OUTPATIENT
Start: 2019-10-04 | End: 2019-10-04 | Stop reason: HOSPADM

## 2019-10-04 RX ORDER — BACITRACIN ZINC 500 [USP'U]/G
OINTMENT TOPICAL
Status: DISCONTINUED
Start: 2019-10-04 | End: 2019-10-04 | Stop reason: HOSPADM

## 2019-10-04 RX ORDER — EPINEPHRINE 1 MG/ML
INJECTION INTRAMUSCULAR; INTRAVENOUS; SUBCUTANEOUS
Status: DISCONTINUED
Start: 2019-10-04 | End: 2019-10-04 | Stop reason: HOSPADM

## 2019-10-04 RX ORDER — CEFAZOLIN SODIUM 1 G/3ML
INJECTION, POWDER, FOR SOLUTION INTRAMUSCULAR; INTRAVENOUS PRN
Status: DISCONTINUED | OUTPATIENT
Start: 2019-10-04 | End: 2019-10-04 | Stop reason: SURG

## 2019-10-04 RX ORDER — ROCURONIUM BROMIDE 10 MG/ML
INJECTION, SOLUTION INTRAVENOUS PRN
Status: DISCONTINUED | OUTPATIENT
Start: 2019-10-04 | End: 2019-10-04 | Stop reason: SURG

## 2019-10-04 RX ORDER — LIDOCAINE HYDROCHLORIDE AND EPINEPHRINE 10; 10 MG/ML; UG/ML
INJECTION, SOLUTION INFILTRATION; PERINEURAL
Status: DISCONTINUED
Start: 2019-10-04 | End: 2019-10-04 | Stop reason: HOSPADM

## 2019-10-04 RX ORDER — LIDOCAINE HYDROCHLORIDE 20 MG/ML
INJECTION, SOLUTION EPIDURAL; INFILTRATION; INTRACAUDAL; PERINEURAL PRN
Status: DISCONTINUED | OUTPATIENT
Start: 2019-10-04 | End: 2019-10-04 | Stop reason: SURG

## 2019-10-04 RX ORDER — HYDROMORPHONE HYDROCHLORIDE 1 MG/ML
0.1 INJECTION, SOLUTION INTRAMUSCULAR; INTRAVENOUS; SUBCUTANEOUS
Status: DISCONTINUED | OUTPATIENT
Start: 2019-10-04 | End: 2019-10-04 | Stop reason: HOSPADM

## 2019-10-04 RX ORDER — ERYTHROMYCIN 250 MG/1
250 TABLET, COATED ORAL 4 TIMES DAILY
COMMUNITY
End: 2022-05-26

## 2019-10-04 RX ORDER — SODIUM CHLORIDE, SODIUM LACTATE, POTASSIUM CHLORIDE, CALCIUM CHLORIDE 600; 310; 30; 20 MG/100ML; MG/100ML; MG/100ML; MG/100ML
INJECTION, SOLUTION INTRAVENOUS CONTINUOUS
Status: DISCONTINUED | OUTPATIENT
Start: 2019-10-04 | End: 2019-10-04 | Stop reason: HOSPADM

## 2019-10-04 RX ORDER — HALOPERIDOL 5 MG/ML
1 INJECTION INTRAMUSCULAR
Status: DISCONTINUED | OUTPATIENT
Start: 2019-10-04 | End: 2019-10-04 | Stop reason: HOSPADM

## 2019-10-04 RX ORDER — HYDROMORPHONE HYDROCHLORIDE 1 MG/ML
0.2 INJECTION, SOLUTION INTRAMUSCULAR; INTRAVENOUS; SUBCUTANEOUS
Status: DISCONTINUED | OUTPATIENT
Start: 2019-10-04 | End: 2019-10-04 | Stop reason: HOSPADM

## 2019-10-04 RX ORDER — LABETALOL HYDROCHLORIDE 5 MG/ML
5 INJECTION, SOLUTION INTRAVENOUS
Status: DISCONTINUED | OUTPATIENT
Start: 2019-10-04 | End: 2019-10-04 | Stop reason: HOSPADM

## 2019-10-04 RX ORDER — BUPIVACAINE HYDROCHLORIDE 5 MG/ML
INJECTION, SOLUTION EPIDURAL; INTRACAUDAL
Status: DISCONTINUED
Start: 2019-10-04 | End: 2019-10-04 | Stop reason: HOSPADM

## 2019-10-04 RX ORDER — DIPHENHYDRAMINE HYDROCHLORIDE 50 MG/ML
INJECTION INTRAMUSCULAR; INTRAVENOUS PRN
Status: DISCONTINUED | OUTPATIENT
Start: 2019-10-04 | End: 2019-10-04 | Stop reason: SURG

## 2019-10-04 RX ORDER — MIDAZOLAM HYDROCHLORIDE 1 MG/ML
INJECTION INTRAMUSCULAR; INTRAVENOUS PRN
Status: DISCONTINUED | OUTPATIENT
Start: 2019-10-04 | End: 2019-10-04 | Stop reason: SURG

## 2019-10-04 RX ORDER — BACITRACIN ZINC 500 [USP'U]/G
OINTMENT TOPICAL
Status: DISCONTINUED | OUTPATIENT
Start: 2019-10-04 | End: 2019-10-04 | Stop reason: HOSPADM

## 2019-10-04 RX ORDER — HYDROMORPHONE HYDROCHLORIDE 1 MG/ML
0.4 INJECTION, SOLUTION INTRAMUSCULAR; INTRAVENOUS; SUBCUTANEOUS
Status: DISCONTINUED | OUTPATIENT
Start: 2019-10-04 | End: 2019-10-04 | Stop reason: HOSPADM

## 2019-10-04 RX ADMIN — SUGAMMADEX 200 MG: 100 INJECTION, SOLUTION INTRAVENOUS at 09:47

## 2019-10-04 RX ADMIN — PROPOFOL 250 MG: 10 INJECTION, EMULSION INTRAVENOUS at 07:43

## 2019-10-04 RX ADMIN — SODIUM CHLORIDE, POTASSIUM CHLORIDE, SODIUM LACTATE AND CALCIUM CHLORIDE: 600; 310; 30; 20 INJECTION, SOLUTION INTRAVENOUS at 07:35

## 2019-10-04 RX ADMIN — OXYCODONE HYDROCHLORIDE 10 MG: 5 SOLUTION ORAL at 11:00

## 2019-10-04 RX ADMIN — FENTANYL CITRATE 50 MCG: 50 INJECTION INTRAMUSCULAR; INTRAVENOUS at 10:57

## 2019-10-04 RX ADMIN — ROCURONIUM BROMIDE 50 MG: 10 INJECTION, SOLUTION INTRAVENOUS at 07:43

## 2019-10-04 RX ADMIN — SODIUM CHLORIDE, POTASSIUM CHLORIDE, SODIUM LACTATE AND CALCIUM CHLORIDE: 600; 310; 30; 20 INJECTION, SOLUTION INTRAVENOUS at 08:55

## 2019-10-04 RX ADMIN — FENTANYL CITRATE 50 MCG: 50 INJECTION, SOLUTION INTRAMUSCULAR; INTRAVENOUS at 08:23

## 2019-10-04 RX ADMIN — FENTANYL CITRATE 50 MCG: 50 INJECTION, SOLUTION INTRAMUSCULAR; INTRAVENOUS at 08:06

## 2019-10-04 RX ADMIN — HYDROMORPHONE HYDROCHLORIDE 1 MG: 2 INJECTION, SOLUTION INTRAMUSCULAR; INTRAVENOUS; SUBCUTANEOUS at 09:48

## 2019-10-04 RX ADMIN — MIDAZOLAM 2 MG: 1 INJECTION INTRAMUSCULAR; INTRAVENOUS at 07:35

## 2019-10-04 RX ADMIN — FENTANYL CITRATE 50 MCG: 50 INJECTION, SOLUTION INTRAMUSCULAR; INTRAVENOUS at 08:44

## 2019-10-04 RX ADMIN — ONDANSETRON 4 MG: 2 INJECTION INTRAMUSCULAR; INTRAVENOUS at 12:12

## 2019-10-04 RX ADMIN — FENTANYL CITRATE 50 MCG: 50 INJECTION, SOLUTION INTRAMUSCULAR; INTRAVENOUS at 09:15

## 2019-10-04 RX ADMIN — ROCURONIUM BROMIDE 20 MG: 10 INJECTION, SOLUTION INTRAVENOUS at 08:46

## 2019-10-04 RX ADMIN — KETOROLAC TROMETHAMINE 30 MG: 30 INJECTION, SOLUTION INTRAMUSCULAR at 09:47

## 2019-10-04 RX ADMIN — ONDANSETRON 4 MG: 2 INJECTION INTRAMUSCULAR; INTRAVENOUS at 09:47

## 2019-10-04 RX ADMIN — EPHEDRINE SULFATE 25 MG: 50 INJECTION, SOLUTION INTRAVENOUS at 07:53

## 2019-10-04 RX ADMIN — FENTANYL CITRATE 50 MCG: 50 INJECTION INTRAMUSCULAR; INTRAVENOUS at 11:35

## 2019-10-04 RX ADMIN — DIPHENHYDRAMINE HYDROCHLORIDE 12.5 MG: 50 INJECTION, SOLUTION INTRAMUSCULAR; INTRAVENOUS at 09:47

## 2019-10-04 RX ADMIN — FENTANYL CITRATE 50 MCG: 50 INJECTION, SOLUTION INTRAMUSCULAR; INTRAVENOUS at 07:43

## 2019-10-04 RX ADMIN — CEFAZOLIN 2 G: 330 INJECTION, POWDER, FOR SOLUTION INTRAMUSCULAR; INTRAVENOUS at 07:51

## 2019-10-04 RX ADMIN — LIDOCAINE HYDROCHLORIDE 100 MG: 20 INJECTION, SOLUTION EPIDURAL; INFILTRATION; INTRACAUDAL at 07:43

## 2019-10-04 ASSESSMENT — PAIN SCALES - GENERAL: PAIN_LEVEL: 4

## 2019-10-04 NOTE — OR NURSING
1015-Received via gurney from OR.  Nasal airway noted right nares.  RR even, unlabored.  Neck & left forehead dressing C/D/I    1026-Arousing, nasal airway out.  HOB elevated 35 degrees.  Occassional moist cough noted.  Stated smoker    1030-Returns easily to sleep    1035-Dr Lozano in to talk with patient.  Dr Nelson in to check on patient.  States pain 2-3/10    1100-Friend at bedside.  Crying with CO pain 8/10-medicated    1115-Taking sips water PO.  States pain easing    1130-Pain remains 6/10-medicated    1145-Pain tolerable 4/10.  Resting.  Transferred to phase II per criteria    1205-DC instructions discussed & signed.  Questions answered    1215-Returns easily to sleep    1220-CO nausea-medicated    1235-States nausea much better    1300-Up to chair-nausea returned.  Quease ease and sips ginger ale.  States feeling better    1315-Dc via WC to car.  Gait steady.  Pain tolerable at 4/10

## 2019-10-04 NOTE — H&P
DATE OF SURGERY:  Coming in early tomorrow morning at the [facility].    ADMITTING DIAGNOSES:  1.  Left submaxillary sialadenitis and sialolithiasis.  2.  History of suspect for cardiac disease, currently cleared cardiologically   for surgery.  3.  Tobacco abuse.  4.  MULTIPLE ALLERGIES INCLUDING VANCOMYCIN, PENICILLIN, AND VICODIN.    CHIEF COMPLAINT:  Swollen left neck gland.    HISTORY OF PRESENT ILLNESS:  This 54-year-old unemployed  was seen in   our office in 07/2019.  She presented with enlarged submaxillary gland with a   large stone.  Her story was complex including previous scheduling for left   submaxillary sialadenitis by Dr. Solis.  Apparently, her cardiac status was   cleared in the past by Dr. Augustin, but the clearance time had elapsed and her   surgery last year was canceled pending cardiology clearance.    PAST MEDICAL HISTORY:  Employment noted.    HABITS:  The patient continues to smoke quarter of a pack of cigarettes daily.    Alcohol, none.    MEDICATIONS:  None regularly.    SERIOUS MEDICAL PROBLEMS:  In the past diagnosed with a heart murmur early in   life, which was variably reported as pulmonary stenosis versus aortic   stenosis.  The patient has been a substance abuser in the distant past,   including methamphetamines.    PAST SURGICAL HISTORY:  Have included rotator cuff repair, C-sections x4, D   and C.    MEDICAL ALLERGIES:  INCLUDE VANCOMYCIN, PENICILLIN AND HYDROCODONE.    REVIEW OF SYSTEMS:  Positive in that she has had a history of gastroesophageal   reflux disease.  She has occasional GI distress.  Patient also has occasional   blurred vision.  She denied shortness of breath.  She has occasional   palpitations.    PHYSICAL EXAMINATION:  GENERAL:  Revealed an alert female, in no distress.  VITAL SIGNS:  Height 5 feet 4 inches, weight 140 pounds.  BMI is about 25   kg/m2.  HEENT:  Head negative.  Ear canals and drums normal.  Nose, mouth, and throat   were  negative.  NECK:  Left neck revealed submaxillary gland enlargement with firm stone in   the floor of the mouth in the hilum of the gland.  Remainder of neck is   negative.  CHEST:  Symmetrical and clear.  ABDOMEN:  Grossly normal.  HEART:  Tones revealed no murmurs.  Breath sounds were clear.  Peripheral   perfusion was good.  ABDOMEN:  Grossly normal.  BREASTS PELVIC:  Not done.  EXTREMITIES:  Negative.  NEUROLOGIC:  Negative.    IMAGIN.  Review of echocardiogram revealed normal echocardiogram as interpreted by   Dr. Wilcox.  2.  Review of CT scan and neck revealed left submaxillary sialadenitis with   stone.    LABORATORY DATA:  Patient has also had laboratory data, including hemoglobin   and hematocrit of 15.5 g/dL and 45.1 volumes %.    RECOMMENDATIONS:  Excision left submaxillary gland under anesthesia.  Patient   understood the risks and possible complications including bleeding, infection   and surgical damage to the 7th and 12th nerves as well as the lingual nerve.    She understood and agreed.  She wished to proceed.       ____________________________________     MD NIMCO HORN / ANGEL    DD:  10/03/2019 21:17:52  DT:  10/03/2019 23:04:03    D#:  [id number]  Job#:  [id number]

## 2019-10-04 NOTE — ANESTHESIA POSTPROCEDURE EVALUATION
Patient: Suzanne Eller    Procedure Summary     Date:  10/04/19 Room / Location:  [facility] ROOM 22 / SURGERY SAME DAY [facility]    Anesthesia Start:  0735 Anesthesia Stop:  1017    Procedure:  EXCISION, SUBMANDIBULAR GLAND- SUBMAXILLARY, EXCISION LEFT FOREHEAD CYST (Left Face) Diagnosis:  (SIALOADENTIS)    Surgeon:  Pavithra Lozano M.D. Responsible Provider:  Khris Nelson M.D.    Anesthesia Type:  general ASA Status:  2          Final Anesthesia Type: general  Last vitals  BP   Blood Pressure: 113/67    Temp   37.1 °C (98.8 °F)    Pulse   Pulse: 65   Resp   16    SpO2   89 %(probe changed to earlobe with increase of O2 sat to 100%)      Anesthesia Post Evaluation    Patient location during evaluation: PACU  Patient participation: complete - patient participated  Level of consciousness: awake and alert  Pain score: 4    Airway patency: patent  Anesthetic complications: no  Cardiovascular status: hemodynamically stable  Respiratory status: acceptable  Hydration status: euvolemic    PONV: none           Nurse Pain Score: 4 (NPRS)

## 2019-10-04 NOTE — ANESTHESIA TIME REPORT
Anesthesia Start and Stop Event Times     Date Time Event    10/4/2019 0728 Ready for Procedure     0735 Anesthesia Start     1017 Anesthesia Stop        Responsible Staff  10/04/19    Name Role Begin End    Khris Nelson M.D. Anesth 0735 1017        Preop Diagnosis (Free Text):  Pre-op Diagnosis     SIALOADENTIS        Preop Diagnosis (Codes):    Post op Diagnosis  Submandibular sialolithiasis      Premium Reason      Comments:

## 2019-10-04 NOTE — ANESTHESIA PROCEDURE NOTES
Airway  Date/Time: 10/4/2019 7:43 AM  Performed by: Khris Nelson M.D.  Authorized by: Khris Nelson M.D.     Location:  OR  Urgency:  Elective  Difficult Airway: No    Indications for Airway Management:  Anesthesia  Spontaneous Ventilation: absent    Sedation Level:  Deep  Preoxygenated: Yes    Patient Position:  Sniffing  Mask Difficulty Assessment:  1 - vent by mask  Final Airway Type:  Endotracheal airway  Final Endotracheal Airway:  ETT  Cuffed: Yes    Technique Used for Successful ETT Placement:  Direct laryngoscopy  Insertion Site:  Oral  Blade Type:  Shannon  Laryngoscope Blade/Videolaryngoscope Blade Size:  3  ETT Size (mm):  7.0  Measured from:  Gums  ETT to Gums (cm):  22  Placement Verified by: auscultation and capnometry    Cormack-Lehane Classification:  Grade I - full view of glottis  Number of Attempts at Approach:  1   Oral TIFFANIE right side

## 2019-10-04 NOTE — ANESTHESIA PREPROCEDURE EVALUATION
Pt reports hx poly substance abuse including methamphetamine and opioids. Also uses tobacco and MJ but trying to quit.    Relevant Problems   CARDIAC   (+) Aortic stenosis       Physical Exam    Airway   Mallampati: I  TM distance: >3 FB  Neck ROM: full       Cardiovascular - normal exam  Rhythm: regular  Rate: normal  (-) murmur     Dental - normal exam  (+) upper dentures, lower dentures         Pulmonary - normal exam  Breath sounds clear to auscultation     Abdominal    Neurological - normal exam         Other findings: rECENT ECHO NO PROBLEMS            Anesthesia Plan    ASA 2       Plan - general       Airway plan will be ETT  (Oral TIFFANIE)      Induction: intravenous    Postoperative Plan: Postoperative administration of opioids is intended.    Pertinent diagnostic labs and testing reviewed    Informed Consent:    Anesthetic plan and risks discussed with patient.    Use of blood products discussed with: patient whom consented to blood products.

## 2019-10-04 NOTE — OR SURGEON
Immediate Post OP Note    PreOp Diagnosis: left submaxillary sialadenitis/sialithiasis.  Lipoma, lt forehead-2cm    PostOp Diagnosis: same    Procedure(s):  EXCISION, SUBMANDIBULAR GLAND- SUBMAXILLARY, EXCISION LEFT FOREHEAD CYST - Wound Class: Clean Contaminated    Surgeon(s):  Pavithra Lozano M.D.    Anesthesiologist/Type of Anesthesia:  Anesthesiologist: Khris Nelson M.D./General    Surgical Staff:  Assistant: Mallika Cruz R.N.  Circulator: YASMIN Osei Circulator: Amira Harper R.N.  Relief Scrub: Amita Casey  Scrub Person: Eryn Wisdom    Specimens removed if any:  ID Type Source Tests Collected by Time Destination   1 : submaxillary duct Other Other AFB CULTURE, FUNGAL CULTURE, AEROBIC/ANAEROBIC CULTURE (SURGERY) Pavithra Lozano M.D. 10/4/2019  8:06 AM    A : left forehead lipoma  Other Other PATHOLOGY SPECIMEN Pavithra Lozano M.D. 10/4/2019  9:44 AM    B : submaxillary gland with stone Other Other PATHOLOGY SPECIMEN Pavithra Lozano M.D. 10/4/2019  9:44 AM        Estimated Blood Loss: minimal    Findings: impacted lt submaxillary gland/stone, inflamed lt submaxillaryduct, lt floor of mouth.   Lipoma, lt forehead  2cm    Complications: none        10/4/2019 10:22 AM Pavithra Lozano M.D.

## 2019-10-04 NOTE — DISCHARGE INSTRUCTIONS
ACTIVITY: Rest and take it easy for the first 24 hours.  A responsible adult is recommended to remain with you during that time.  It is normal to feel sleepy.  We encourage you to not do anything that requires balance, judgment or coordination.    MILD FLU-LIKE SYMPTOMS ARE NORMAL. YOU MAY EXPERIENCE GENERALIZED MUSCLE ACHES, THROAT IRRITATION, HEADACHE AND/OR SOME NAUSEA.    FOR 24 HOURS DO NOT:  Drive, operate machinery or run household appliances.  Drink beer or alcoholic beverages.   Make important decisions or sign legal documents.    SPECIAL INSTRUCTIONS: *FOLLOW DR RANGEL'S INSTRUCTIONS**    DIET: To avoid nausea, slowly advance diet as tolerated, avoiding spicy or greasy foods for the first day.  Add more substantial food to your diet according to your physician's instructions.  Babies can be fed formula or breast milk as soon as they are hungry.  INCREASE FLUIDS AND FIBER TO AVOID CONSTIPATION.    SURGICAL DRESSING/BATHING: *MAY SHOWER TOMORROW**    FOLLOW-UP APPOINTMENT:  A follow-up appointment should be arranged with your doctor in *1 WEEK**; call to schedule.    You should CALL YOUR PHYSICIAN if you develop:  Fever greater than 101 degrees F.  Pain not relieved by medication, or persistent nausea or vomiting.  Excessive bleeding (blood soaking through dressing) or unexpected drainage from the wound.  Extreme redness or swelling around the incision site, drainage of pus or foul smelling drainage.  Inability to urinate or empty your bladder within 8 hours.  Problems with breathing or chest pain.    You should call 911 if you develop problems with breathing or chest pain.  If you are unable to contact your doctor or surgical center, you should go to the nearest emergency room or urgent care center.  Physician's telephone #: *[phone]**    If any questions arise, call your doctor.  If your doctor is not available, please feel free to call the Surgical Center at [phone].  The Center is open Monday  through Friday from 7AM to 7PM.  You can also call the HEALTH HOTLINE open 24 hours/day, 7 days/week and speak to a nurse at [phone], or toll free at [phone].    A registered nurse may call you a few days after your surgery to see how you are doing after your procedure.    MEDICATIONS: Resume taking daily medication.  Take prescribed pain medication with food.  If no medication is prescribed, you may take non-aspirin pain medication if needed.  PAIN MEDICATION CAN BE VERY CONSTIPATING.  Take a stool softener or laxative such as senokot, pericolace, or milk of magnesia if needed.    Prescription given for *OXYCODONE, CLEOCIN, & ZOFRAN**.  Last pain medication given at **OXYCODONE GIVEN AT 1100AM*.    If your physician has prescribed pain medication that includes Acetaminophen (Tylenol), do not take additional Acetaminophen (Tylenol) while taking the prescribed medication.    Depression / Suicide Risk    As you are discharged from this [facility] facility, it is important to learn how to keep safe from harming yourself.    Recognize the warning signs:  · Abrupt changes in personality, positive or negative- including increase in energy   · Giving away possessions  · Change in eating patterns- significant weight changes-  positive or negative  · Change in sleeping patterns- unable to sleep or sleeping all the time   · Unwillingness or inability to communicate  · Depression  · Unusual sadness, discouragement and loneliness  · Talk of wanting to die  · Neglect of personal appearance   · Rebelliousness- reckless behavior  · Withdrawal from people/activities they love  · Confusion- inability to concentrate     If you or a loved one observes any of these behaviors or has concerns about self-harm, here's what you can do:  · Talk about it- your feelings and reasons for harming yourself  · Remove any means that you might use to hurt yourself (examples: pills, rope, extension cords, firearm)  · Get professional  help from the community (Mental Health, Substance Abuse, psychological counseling)  · Do not be alone:Call your Safe Contact- someone whom you trust who will be there for you.  · Call your local CRISIS HOTLINE 045-7418 or 273-427-3508  · Call your local Children's Mobile Crisis Response Team [place] (920) 186-2088 or www.[employer]  · Call the toll free National Suicide Prevention Hotlines   · National Suicide Prevention Lifeline 177-925-GQAP (3774)  · National Hope Line Network 800-SUICIDE (903-2690)

## 2019-10-04 NOTE — ANESTHESIA QCDR
2019 [facility] Clinical Data Registry (for Quality Improvement)     Postoperative nausea/vomiting risk protocol (Adult = 18 yrs and Pediatric 3-17 yrs)- (430 and 463)  General inhalation anesthetic (NOT TIVA) with PONV risk factors: Yes  Provision of anti-emetic therapy with at least 2 different classes of agents: Yes   Patient DID NOT receive anti-emetic therapy and reason is documented in Medical Record:  N/A    Multimodal Pain Management- (AQI59)  Patient undergoing Elective Surgery (i.e. Outpatient, or ASC, or Prescheduled Surgery prior to Hospital Admission): Yes  Use of Multimodal Pain Management, two or more drugs and/or interventions, NOT including systemic opioids: Yes   Exception: Documented allergy to multiple classes of analgesics:  N/A    PACU assessment of acute postoperative pain prior to Anesthesia Care End- Applies to Patients Age = 18- (ABG7)  Initial PACU pain score is which of the following: < 7/10  Patient unable to report pain score: N/A    Post-anesthetic transfer of care checklist/protocol to PACU/ICU- (426 and 427)  Upon conclusion of case, patient transferred to which of the following locations: PACU/Non-ICU  Use of transfer checklist/protocol: Yes  Exclusion: Service Performed in Patient Hospital Room (and thus did not require transfer): N/A    PACU Reintubation- (AQI31)  General anesthesia requiring endotracheal intubation (ETT) along with subsequent extubation in OR or PACU: Yes  Required reintubation in the PACU: No   Extubation was a planned trial documented in the medical record prior to removal of the original airway device:  N/A    Unplanned admission to ICU related to anesthesia service up through end of PACU care- (MD51)  Unplanned admission to ICU (not initially anticipated at anesthesia start time): No

## 2019-10-04 NOTE — OP REPORT
DATE OF SERVICE:  10/04/2019    PROCEDURES:  1. Excision of right submaxillary gland, multiple approaches, external neck   and intraoral floor of mouth.  2. Excision lipoma, left forehead with complex closure.  Size was 2 cm.    PREOPERATIVE DIAGNOSES:  1. Left submaxillary sialadenitis and sialolithiasis.  2. Lipoma, left forehead.    POSTOPERATIVE DIAGNOSES:  1. Left submaxillary sialadenitis and sialolithiasis.  2. Lipoma, left forehead.    ANESTHESIA:  General endotracheal.    ANESTHESIOLOGIST:  Khris Nelson MD    LOCAL ADJUNCTS:  Equal amounts of 0.5% Marcaine and 1% lidocaine 1:95,000   epinephrine, total amount 20 mL injected submucosally around the floor of   mouth, left neck, and forehead over a period of 1-1/2 hours.    INDICATIONS:  This 54-year-old female has had a large stone in the left   submaxillary gland and has been plagued by pain and pressure with purulent   discharge for several months or years.  She required a preoperative cardiac   clearance, which she received.  Her transthoracic cardiac ultrasound was   normal.    INFORMED CONSENT ISSUES:  Patient and her  understood the risks and   possible complications including bleeding, infection, left neck scar, numbness   of the left face, weakness of the left lower lip, numbness of the left   tongue, asymmetric movement of the tongue, and the need for further medical   and/or surgical management.  She did wish to proceed.    DESCRIPTION OF PROCEDURE:  The patient was brought to operating room 22 by Dr. Nelson at the [facility].  He performed general endotracheal   anesthesia without complication.  A preoperative time-out was successfully   conducted.  The table was turned 140 degrees clockwise.  The patient was   properly positioned for left external neck approach as well as floor of mouth   approach and forehead lipoma excision.  The areas in question were marked,   blocked, and prepped and draped in the usual sterile  fashion.    A left neck incision was made below the angle of mandible position quite   posteriorly to avoid anterior platysmal lip depressor function.  Subplatysmal   flaps were elevated.  The gland was identified.  An external jugular branch   was doubly clamped and ligated with medium silk.  There was a good deal of   scarring and firmness of the gland.  We were able to negotiate the common   tendon of the digastric and dissect the inferior portion of the gland around   to the mylohyoid muscle.  These planes were also opened and dissected using   sharp and blunt dissection and the Harmonic scalpel.    Posteriorly, the facial artery was identified, doubly clamped, ligated, and   suture ligated.  We were then able to dissect the gland off the inferior   margin of the mandible along with a number of lymph nodes that were noted.    Toward the hilum of the gland, there was dense scarring.  We were able to   identify what we thought was the lingual nerve.  There was a large stone   impacted in the hilum of the gland, which was exteriorized.  We were then able   to dissect the duct along the hilum toward the floor of the mouth.    We did change the approach to the floor of mouth.  We were able to retract the   tongue and lips so that we could identify the floor of mouth Toole's duct.    The tip of the duct was clamped.  The mucosa surrounding the duct was incised   sharply.  The duct was bluntly and sharply dissected down toward the hilum of   the gland and the submaxillary triangle.  A clamp was passed from the neck   out to the mouth.  The duct was then retrograded into the external surgical   wound and further dissected along with the remainder of the gland and removed   for pathologic evaluation.  There was additional inflammatory tissue in the   hilum of the gland where the previous stone was impacted.  This tissue was   excised and irrigated clear with normal saline.  We do believe that the   lingual nerve was  preserved.  The hypoglossal nerve was kept out of the field.    Wounds were irrigated with normal saline, after which a half-inch Penrose   drain was placed.  Surgiflo was placed.  The platysmal tissues were closed   with medium Vicryl while the subcuticular tissues were closed with medium   Monocryl.  The skin was run with fine nylon.    's attention on the left forehead.  A relaxed skin tension line in the   forehead was utilized to incise over the lipoma.  The lipoma was excised   leaving dermis intact and frontal periosteum intact.  Bleeders were bipolarly   cauterized.  The wound was irrigated clear with normal saline and then closed   in layers with fine Monocryl and fine nylon.    Intraorally, the oral mucosa over the floor of mouth was closed carefully with   interrupted fine chromic sutures.    Light dressings were placed.  Procedure was terminated.    ESTIMATED BLOOD LOSS:  Minimal.    Sharps and sponge counts reported as correct.  Patient was taken to recovery   room in stable condition.             ____________________________________     MD NIMCO HORN / ANGEL    DD:  10/04/2019 10:14:08  DT:  10/04/2019 12:32:38    D#:  [id number]  Job#:  [id number]    cc: Cassandra Oliveira MD, CRISTINA HOLDEN MD

## 2019-10-05 LAB
RHODAMINE-AURAMINE STN SPEC: NORMAL
SIGNIFICANT IND 70042: NORMAL
SITE SITE: NORMAL
SOURCE SOURCE: NORMAL

## 2019-10-07 LAB
BACTERIA WND AEROBE CULT: ABNORMAL
GRAM STN SPEC: ABNORMAL
SIGNIFICANT IND 70042: ABNORMAL
SITE SITE: ABNORMAL
SOURCE SOURCE: ABNORMAL

## 2019-10-08 LAB
BACTERIA SPEC ANAEROBE CULT: ABNORMAL
BACTERIA SPEC ANAEROBE CULT: ABNORMAL
SIGNIFICANT IND 70042: ABNORMAL
SITE SITE: ABNORMAL
SOURCE SOURCE: ABNORMAL

## 2019-10-30 LAB
FUNGUS SPEC CULT: NORMAL
SIGNIFICANT IND 70042: NORMAL
SITE SITE: NORMAL
SOURCE SOURCE: NORMAL

## 2019-11-29 LAB
MYCOBACTERIUM SPEC CULT: NORMAL
RHODAMINE-AURAMINE STN SPEC: NORMAL
SIGNIFICANT IND 70042: NORMAL
SITE SITE: NORMAL
SOURCE SOURCE: NORMAL

## 2022-05-26 ENCOUNTER — OFFICE VISIT (OUTPATIENT)
Dept: MEDICAL GROUP | Facility: MEDICAL CENTER | Age: 57
End: 2022-05-26
Attending: INTERNAL MEDICINE
Payer: COMMERCIAL

## 2022-05-26 VITALS
WEIGHT: 148 LBS | RESPIRATION RATE: 16 BRPM | BODY MASS INDEX: 25.27 KG/M2 | TEMPERATURE: 97.3 F | OXYGEN SATURATION: 95 % | HEIGHT: 64 IN | HEART RATE: 90 BPM | DIASTOLIC BLOOD PRESSURE: 82 MMHG | SYSTOLIC BLOOD PRESSURE: 120 MMHG

## 2022-05-26 DIAGNOSIS — H53.8 BLURRED VISION, BILATERAL: ICD-10-CM

## 2022-05-26 DIAGNOSIS — M19.042 PRIMARY OSTEOARTHRITIS OF LEFT HAND: ICD-10-CM

## 2022-05-26 DIAGNOSIS — Z12.11 SCREEN FOR COLON CANCER: ICD-10-CM

## 2022-05-26 DIAGNOSIS — Z12.31 SCREENING MAMMOGRAM, ENCOUNTER FOR: ICD-10-CM

## 2022-05-26 DIAGNOSIS — Z11.59 SCREENING FOR VIRAL DISEASE: ICD-10-CM

## 2022-05-26 DIAGNOSIS — Z13.220 SCREENING, LIPID: ICD-10-CM

## 2022-05-26 DIAGNOSIS — Z72.0 TOBACCO USE: ICD-10-CM

## 2022-05-26 DIAGNOSIS — Z13.1 SCREENING FOR DIABETES MELLITUS: ICD-10-CM

## 2022-05-26 DIAGNOSIS — F10.21 ALCOHOLISM IN REMISSION (HCC): ICD-10-CM

## 2022-05-26 DIAGNOSIS — Z23 NEED FOR VACCINATION: ICD-10-CM

## 2022-05-26 PROBLEM — F15.11 METHAMPHETAMINE ABUSE IN REMISSION (HCC): Status: ACTIVE | Noted: 2018-11-27

## 2022-05-26 PROBLEM — I35.0 AORTIC STENOSIS: Status: RESOLVED | Noted: 2018-11-27 | Resolved: 2022-05-26

## 2022-05-26 PROBLEM — Z01.810 PRE-OPERATIVE CARDIOVASCULAR EXAMINATION: Status: RESOLVED | Noted: 2019-08-22 | Resolved: 2022-05-26

## 2022-05-26 PROBLEM — K11.5 SALIVARY DUCT CALCULI: Status: RESOLVED | Noted: 2018-11-27 | Resolved: 2022-05-26

## 2022-05-26 PROBLEM — R21 RASH: Status: RESOLVED | Noted: 2018-12-20 | Resolved: 2022-05-26

## 2022-05-26 PROCEDURE — 99214 OFFICE O/P EST MOD 30 MIN: CPT | Performed by: INTERNAL MEDICINE

## 2022-05-26 PROCEDURE — 99213 OFFICE O/P EST LOW 20 MIN: CPT | Mod: 25 | Performed by: INTERNAL MEDICINE

## 2022-05-26 PROCEDURE — 90715 TDAP VACCINE 7 YRS/> IM: CPT

## 2022-05-26 RX ORDER — NICOTINE 21 MG/24HR
1 PATCH, TRANSDERMAL 24 HOURS TRANSDERMAL EVERY 24 HOURS
Qty: 28 PATCH | Refills: 0 | Status: SHIPPED | OUTPATIENT
Start: 2022-05-26

## 2022-05-26 ASSESSMENT — PATIENT HEALTH QUESTIONNAIRE - PHQ9: CLINICAL INTERPRETATION OF PHQ2 SCORE: 0

## 2022-05-26 NOTE — ASSESSMENT & PLAN NOTE
She reports difficulty with vision for several years but that it has become progressively worse over the past 6 months.  She states that she cannot see anything farther than a couple of inches from her eyes without severe blurring.  She does not drive.  She can still read but she is having trouble watching TV.  She has never had her eyes evaluated.  She denies eye pain, numbness, weakness, tingling throughout her body, slurred speech, change in memory.

## 2022-05-27 NOTE — ASSESSMENT & PLAN NOTE
Currently smoking about half pack per day and desires to quit.  Is interested in trying nicotine patches.

## 2022-05-27 NOTE — PROGRESS NOTES
Subjective:   Suzanne Eller is a 56 y.o. female here today for blurry vision, yearly check up, hand pain    Blurred vision, bilateral  She reports difficulty with vision for several years but that it has become progressively worse over the past 6 months.  She states that she cannot see anything farther than a couple of inches from her eyes without severe blurring.  She does not drive.  She can still read but she is having trouble watching TV.  She has never had her eyes evaluated.  She denies eye pain, numbness, weakness, tingling throughout her body, slurred speech, change in memory.      Primary osteoarthritis of left hand  She continues to have some pain in the left hand especially at the base of the thumb.  She reports that this has improved since the first time she brought it up to me which was several years ago.  At that time we did an x-ray which showed severe osteoarthritis.  It still bothers her on occasion.    Tobacco use  Currently smoking about half pack per day and desires to quit.  Is interested in trying nicotine patches.       Current medicines (including changes today)  Current Outpatient Medications   Medication Sig Dispense Refill   • nicotine (NICODERM) 7 MG/24HR PATCH 24 HR Place 1 Patch on the skin every 24 hours. 28 Patch 0   • nicotine (NICODERM) 14 MG/24HR PATCH 24 HR Place 1 Patch on the skin every 24 hours. 28 Patch 0     No current facility-administered medications for this visit.     She  has a past medical history of Anemia, Aortic stenosis, Arthritis, Bowel habit changes, Cough, Dental disorder, Depression, Heart burn, Pain (09/19/2019), Salivary gland calculi, and Substance abuse (Formerly [facility]).         Objective:     Vitals:    05/26/22 1758   BP: 120/82   Pulse: 90   Resp: 16   Temp: 36.3 °C (97.3 °F)   SpO2: 95%     Body mass index is 25.39 kg/m².   Physical Exam:  Constitutional: Alert, no distress.  Skin: Warm, dry, good turgor, no rashes in visible areas.  Eye: Equal, round and  reactive, conjunctiva clear, lids normal.  ENMT: Lips without lesions, edentulous, oropharynx clear,R TM obscured by wax, L TM clear  Neck: Trachea midline, no masses, no thyromegaly. No cervical or supraclavicular lymphadenopathy  Respiratory: Unlabored respiratory effort, lungs clear to auscultation, no wheezes, no ronchi.  Cardiovascular: Regular rate and rhythm, no murmurs appreciated, no lower extremity edema  Abdomen: Soft, non-tender, no masses, no hepatosplenomegaly.  Psych: Alert and oriented x3, normal affect and mood.      Assessment and Plan:   The following treatment plan was discussed    1. Screening mammogram, encounter for  - MA-SCREENING MAMMO BILAT W/TOMOSYNTHESIS W/CAD; Future    2. Blurred vision, bilateral  Suspect she has developed cataracts however we discussed that she needs a detailed eye exam.  If her exam is normal would consider brain imaging.  Lower suspicion for central process given the slow onset of her symptoms.  - Referral to Ophthalmology    3. Screening for diabetes mellitus  - HEMOGLOBIN A1C; Future    4. Screening, lipid  - Lipid Profile; Future    5. Tobacco use  - nicotine (NICODERM) 7 MG/24HR PATCH 24 HR; Place 1 Patch on the skin every 24 hours.  Dispense: 28 Patch; Refill: 0  - nicotine (NICODERM) 14 MG/24HR PATCH 24 HR; Place 1 Patch on the skin every 24 hours.  Dispense: 28 Patch; Refill: 0    6. Screen for colon cancer  - Referral to GI for Colonoscopy    7. Need for vaccination  - Tdap =>8yo IM    8. Screening for viral disease  - HCV Scrn ( [id number] 1xLife); Future    9. 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 Primary osteoarthritis of left hand  We discussed continued conservative management.  If pain worsens in the future would consider orthopedic referral for possible injection or surgical intervention given severity.        Followup: Return in about 1 year (around 2023), or if symptoms worsen or fail to improve, for annual.

## 2022-05-27 NOTE — ASSESSMENT & PLAN NOTE
She continues to have some pain in the left hand especially at the base of the thumb.  She reports that this has improved since the first time she brought it up to me which was several years ago.  At that time we did an x-ray which showed severe osteoarthritis.  It still bothers her on occasion.

## (undated) DEVICE — SUTURE 2-0 SILK 12 X 18" (36PK/BX)"

## (undated) DEVICE — ELECTRODE 850 FOAM ADHESIVE - HYDROGEL RADIOTRNSPRNT (50/PK)

## (undated) DEVICE — SPONGE GAUZESTER 4 X 4 4PLY - (128PK/CA)

## (undated) DEVICE — NEPTUNE 4 PORT MANIFOLD - (20/PK)

## (undated) DEVICE — CLOSURE SKIN STRIP 1/2 X 4 IN - (STERI STRIP) (50/BX 4BX/CA)

## (undated) DEVICE — DRAPE STRLE REG TOWEL 18X24 - (10/BX 4BX/CA)"

## (undated) DEVICE — SUTURE 5-0 PROLENE P-3 - (12/BX)

## (undated) DEVICE — TUBE E-T HI-LO CUFF 7.0MM (10EA/PK)

## (undated) DEVICE — SUTURE 5-0 ETHILON FS-2 18 (12PK/BX)"

## (undated) DEVICE — BANDAGE STER SOF-FORM 4X75IN - (12EA/BX 8BX/CA)

## (undated) DEVICE — PROTECTOR ULNA NERVE - (36PR/CA)

## (undated) DEVICE — ELECTRODE DUAL RETURN W/ CORD - (50/PK)

## (undated) DEVICE — SPONGE XRAY 8X4 STERL. 12PL - (10EA/TY 80TY/CA)

## (undated) DEVICE — SPONGE GAUZE NON-STERILE 4X4 - (2000/CA 10PK/CA)

## (undated) DEVICE — SUTURE 6-0 SILK  P-1 (12PK/BX)

## (undated) DEVICE — GOWN SURGEONS X-LARGE - DISP. (30/CA)

## (undated) DEVICE — TUBE CONNECTING SUCTION - CLEAR PLASTIC STERILE 72 IN (50EA/CA)

## (undated) DEVICE — WATER IRRIGATION STERILE 1000ML (12EA/CA)

## (undated) DEVICE — CANISTER SUCTION RIGID RED 1500CC (40EA/CA)

## (undated) DEVICE — KIT ANESTHESIA W/CIRCUIT & 3/LT BAG W/FILTER (20EA/CA)

## (undated) DEVICE — TRAY SKIN SCRUB PVP WET (20EA/CA) PART #DYND70356 DISCONTINUED

## (undated) DEVICE — CATHETER IV 20 GA X 1-1/4 ---SURG.& SDS ONLY--- (50EA/BX)

## (undated) DEVICE — GAUZE FLUFF STERILE 2-PLY 36 X 36 (100EA/CA)

## (undated) DEVICE — KIT  I.V. START (100EA/CA)

## (undated) DEVICE — MASK ANESTHESIA ADULT  - (100/CA)

## (undated) DEVICE — SODIUM CHL IRRIGATION 0.9% 1000ML (12EA/CA)

## (undated) DEVICE — DRESSING ABDOMINAL PAD STERILE 8 X 10" (360EA/CA)"

## (undated) DEVICE — CANISTER SUCTION 3000ML MECHANICAL FILTER AUTO SHUTOFF MEDI-VAC NONSTERILE LF DISP  (40EA/CA)

## (undated) DEVICE — TUBING CLEARLINK DUO-VENT - C-FLO (48EA/CA)

## (undated) DEVICE — RESERVOIR SUCTION 100 CC - SILICONE (20EA/CA)

## (undated) DEVICE — SENSOR SPO2 NEO LNCS ADHESIVE (20/BX) SEE USER NOTES

## (undated) DEVICE — PENCIL ELECTSURG 10FT BTN SWH - (50/CA)

## (undated) DEVICE — SET LEADWIRE 5 LEAD BEDSIDE DISPOSABLE ECG (1SET OF 5/EA)

## (undated) DEVICE — SUTURE 4-0 MONOCRYL PLUS PS-2 - 27 INCH (36/BX)

## (undated) DEVICE — HEAD HOLDER JUNIOR/ADULT

## (undated) DEVICE — BALL COTTON STERILE 5/PK - (5/PK 25PK/CA)

## (undated) DEVICE — TOWELS CLOTH SURGICAL - (4/PK 20PK/CA)

## (undated) DEVICE — GLOVE SZ 7.5 BIOGEL PI MICRO - PF LF (50PR/BX)

## (undated) DEVICE — TRAY SRGPRP PVP IOD WT PRP - (20/CA)

## (undated) DEVICE — GLOVE SZ 6 BIOGEL PI MICRO - PF LF (50PR/BX 4BX/CA)

## (undated) DEVICE — ADHESIVE MASTISOL - (48/BX)

## (undated) DEVICE — SYRINGE DISP. 12 CC LL - (100/BX)

## (undated) DEVICE — SUTURE 3-0 SILK 12 X 18 IN - (36/BX)

## (undated) DEVICE — PACK MINOR BASIN - (2EA/CA)

## (undated) DEVICE — LACTATED RINGERS INJ 1000 ML - (14EA/CA 60CA/PF)

## (undated) DEVICE — SHEAR HS FOCUS 9CM CVD - (6/BX)

## (undated) DEVICE — SLEEVE, VASO, THIGH, MED

## (undated) DEVICE — SUTURE 3-0 VICRYL PLUS SH - 27 INCH (36/BX)

## (undated) DEVICE — CORDS BIPOLAR COAGULATION - 12FT STERILE DISP. (10EA/BX)

## (undated) DEVICE — SUTURE 5-0 VICRYL PLUS P-3 18 (36PK/BX)"

## (undated) DEVICE — SUTURE 4-0 CHROMIC GUTSH 27 (36PK/BX)"

## (undated) DEVICE — SPONGE PEANUT - (5/PK 50PK/CA)

## (undated) DEVICE — SUTURE 2-0 SILK FS (12EA/BX)

## (undated) DEVICE — SUTURE GENERAL

## (undated) DEVICE — DRAPE SURGICAL U 77X120 - (10/CA)

## (undated) DEVICE — GOWN WARMING STANDARD FLEX - (30/CA)

## (undated) DEVICE — PACK ENT OR - (2EA/CA)

## (undated) DEVICE — DRAIN FLAT SUCTION 10MMX20CM - LATEX FREE (10EA/CA)

## (undated) DEVICE — SUTURE 3-0 VICRYL PLUSPS-2 - 18 INCH (36/BX)

## (undated) DEVICE — DRAPE LARGE 3 QUARTER - (20/CA)

## (undated) DEVICE — BANDAGE ROLL STERILE BULKEE 4.5 IN X 4 YD (100EA/CA)

## (undated) DEVICE — SUTURE 4-0 CHROMIC GUT - 18 INCH G-3 D/A (12/BX)

## (undated) DEVICE — SUCTION INSTRUMENT YANKAUER BULBOUS TIP W/O VENT (50EA/CA)

## (undated) DEVICE — KIT SURGIFLO W/OUT THROMBIN - (6EA/CA)

## (undated) DEVICE — DRAIN PENROSE 3/8 IN X 12 IN - STERILE (25EA/BX)

## (undated) DEVICE — GLOVESZ 8.5 BIOGEL PI MICRO - PF LF (50PR/BX)

## (undated) DEVICE — STAPLER SKIN DISP - (6/BX 10BX/CA) VISISTAT